# Patient Record
Sex: FEMALE | Race: WHITE | NOT HISPANIC OR LATINO | URBAN - METROPOLITAN AREA
[De-identification: names, ages, dates, MRNs, and addresses within clinical notes are randomized per-mention and may not be internally consistent; named-entity substitution may affect disease eponyms.]

---

## 2024-08-15 ENCOUNTER — HOSPITAL ENCOUNTER (OUTPATIENT)
Facility: HOSPITAL | Age: 29
Setting detail: OBSERVATION
Discharge: HOME | End: 2024-08-17
Attending: INTERNAL MEDICINE | Admitting: INTERNAL MEDICINE
Payer: COMMERCIAL

## 2024-08-15 ENCOUNTER — APPOINTMENT (OUTPATIENT)
Dept: RADIOLOGY | Facility: HOSPITAL | Age: 29
End: 2024-08-15
Payer: COMMERCIAL

## 2024-08-15 DIAGNOSIS — K35.30 ACUTE APPENDICITIS WITH LOCALIZED PERITONITIS, WITHOUT PERFORATION, ABSCESS, OR GANGRENE: ICD-10-CM

## 2024-08-15 DIAGNOSIS — N39.0 URINARY TRACT INFECTION WITHOUT HEMATURIA, SITE UNSPECIFIED: ICD-10-CM

## 2024-08-15 DIAGNOSIS — K35.30 ACUTE APPENDICITIS WITH LOCALIZED PERITONITIS, UNSPECIFIED WHETHER ABSCESS PRESENT, UNSPECIFIED WHETHER GANGRENE PRESENT, UNSPECIFIED WHETHER PERFORATION PRESENT: ICD-10-CM

## 2024-08-15 DIAGNOSIS — K52.9 COLITIS: Primary | ICD-10-CM

## 2024-08-15 DIAGNOSIS — Z90.49 S/P LAPAROSCOPIC APPENDECTOMY: ICD-10-CM

## 2024-08-15 LAB
ALBUMIN SERPL BCP-MCNC: 4.4 G/DL (ref 3.4–5)
ALP SERPL-CCNC: 79 U/L (ref 33–110)
ALT SERPL W P-5'-P-CCNC: 16 U/L (ref 7–45)
ANION GAP SERPL CALC-SCNC: 12 MMOL/L (ref 10–20)
APPEARANCE UR: ABNORMAL
AST SERPL W P-5'-P-CCNC: 18 U/L (ref 9–39)
B-HCG SERPL-ACNC: <2 MIU/ML
BACTERIA #/AREA URNS AUTO: ABNORMAL /HPF
BASOPHILS # BLD AUTO: 0.07 X10*3/UL (ref 0–0.1)
BASOPHILS NFR BLD AUTO: 0.5 %
BILIRUB SERPL-MCNC: 0.6 MG/DL (ref 0–1.2)
BILIRUB UR STRIP.AUTO-MCNC: NEGATIVE MG/DL
BUN SERPL-MCNC: 11 MG/DL (ref 6–23)
CALCIUM SERPL-MCNC: 9 MG/DL (ref 8.6–10.3)
CHLORIDE SERPL-SCNC: 107 MMOL/L (ref 98–107)
CO2 SERPL-SCNC: 25 MMOL/L (ref 21–32)
COLOR UR: YELLOW
CREAT SERPL-MCNC: 0.86 MG/DL (ref 0.5–1.05)
CRP SERPL-MCNC: 1.27 MG/DL
EGFRCR SERPLBLD CKD-EPI 2021: >90 ML/MIN/1.73M*2
EOSINOPHIL # BLD AUTO: 0.14 X10*3/UL (ref 0–0.7)
EOSINOPHIL NFR BLD AUTO: 1 %
ERYTHROCYTE [DISTWIDTH] IN BLOOD BY AUTOMATED COUNT: 14.5 % (ref 11.5–14.5)
ERYTHROCYTE [SEDIMENTATION RATE] IN BLOOD BY WESTERGREN METHOD: 26 MM/H (ref 0–20)
GLUCOSE SERPL-MCNC: 89 MG/DL (ref 74–99)
GLUCOSE UR STRIP.AUTO-MCNC: NORMAL MG/DL
HCT VFR BLD AUTO: 40.4 % (ref 36–46)
HGB BLD-MCNC: 12.3 G/DL (ref 12–16)
IMM GRANULOCYTES # BLD AUTO: 0.04 X10*3/UL (ref 0–0.7)
IMM GRANULOCYTES NFR BLD AUTO: 0.3 % (ref 0–0.9)
INR PPP: 1.2 (ref 0.9–1.1)
KETONES UR STRIP.AUTO-MCNC: NEGATIVE MG/DL
LACTATE SERPL-SCNC: 0.7 MMOL/L (ref 0.4–2)
LEUKOCYTE ESTERASE UR QL STRIP.AUTO: ABNORMAL
LIPASE SERPL-CCNC: 16 U/L (ref 9–82)
LYMPHOCYTES # BLD AUTO: 2.11 X10*3/UL (ref 1.2–4.8)
LYMPHOCYTES NFR BLD AUTO: 15.5 %
MCH RBC QN AUTO: 25.1 PG (ref 26–34)
MCHC RBC AUTO-ENTMCNC: 30.4 G/DL (ref 32–36)
MCV RBC AUTO: 82 FL (ref 80–100)
MONOCYTES # BLD AUTO: 0.8 X10*3/UL (ref 0.1–1)
MONOCYTES NFR BLD AUTO: 5.9 %
MUCOUS THREADS #/AREA URNS AUTO: ABNORMAL /LPF
NEUTROPHILS # BLD AUTO: 10.41 X10*3/UL (ref 1.2–7.7)
NEUTROPHILS NFR BLD AUTO: 76.8 %
NITRITE UR QL STRIP.AUTO: NEGATIVE
NRBC BLD-RTO: 0 /100 WBCS (ref 0–0)
PH UR STRIP.AUTO: 5 [PH]
PLATELET # BLD AUTO: 436 X10*3/UL (ref 150–450)
POTASSIUM SERPL-SCNC: 3.6 MMOL/L (ref 3.5–5.3)
PROT SERPL-MCNC: 7.4 G/DL (ref 6.4–8.2)
PROT UR STRIP.AUTO-MCNC: ABNORMAL MG/DL
PROTHROMBIN TIME: 13 SECONDS (ref 9.8–12.8)
RBC # BLD AUTO: 4.9 X10*6/UL (ref 4–5.2)
RBC # UR STRIP.AUTO: ABNORMAL /UL
RBC #/AREA URNS AUTO: ABNORMAL /HPF
SODIUM SERPL-SCNC: 140 MMOL/L (ref 136–145)
SP GR UR STRIP.AUTO: 1.03
SQUAMOUS #/AREA URNS AUTO: ABNORMAL /HPF
UROBILINOGEN UR STRIP.AUTO-MCNC: NORMAL MG/DL
WBC # BLD AUTO: 13.6 X10*3/UL (ref 4.4–11.3)
WBC #/AREA URNS AUTO: ABNORMAL /HPF

## 2024-08-15 PROCEDURE — 87086 URINE CULTURE/COLONY COUNT: CPT | Mod: AHULAB | Performed by: NURSE PRACTITIONER

## 2024-08-15 PROCEDURE — 85610 PROTHROMBIN TIME: CPT | Performed by: NURSE PRACTITIONER

## 2024-08-15 PROCEDURE — 99285 EMERGENCY DEPT VISIT HI MDM: CPT | Mod: 25

## 2024-08-15 PROCEDURE — 74177 CT ABD & PELVIS W/CONTRAST: CPT

## 2024-08-15 PROCEDURE — 74177 CT ABD & PELVIS W/CONTRAST: CPT | Performed by: RADIOLOGY

## 2024-08-15 PROCEDURE — 81001 URINALYSIS AUTO W/SCOPE: CPT | Performed by: NURSE PRACTITIONER

## 2024-08-15 PROCEDURE — 80053 COMPREHEN METABOLIC PANEL: CPT | Performed by: NURSE PRACTITIONER

## 2024-08-15 PROCEDURE — 83690 ASSAY OF LIPASE: CPT

## 2024-08-15 PROCEDURE — 85652 RBC SED RATE AUTOMATED: CPT | Performed by: INTERNAL MEDICINE

## 2024-08-15 PROCEDURE — 85025 COMPLETE CBC W/AUTO DIFF WBC: CPT | Performed by: NURSE PRACTITIONER

## 2024-08-15 PROCEDURE — 86140 C-REACTIVE PROTEIN: CPT | Performed by: INTERNAL MEDICINE

## 2024-08-15 PROCEDURE — 84702 CHORIONIC GONADOTROPIN TEST: CPT

## 2024-08-15 PROCEDURE — 2550000001 HC RX 255 CONTRASTS: Performed by: NURSE PRACTITIONER

## 2024-08-15 PROCEDURE — 83605 ASSAY OF LACTIC ACID: CPT | Performed by: NURSE PRACTITIONER

## 2024-08-15 PROCEDURE — 36415 COLL VENOUS BLD VENIPUNCTURE: CPT | Performed by: NURSE PRACTITIONER

## 2024-08-15 RX ORDER — ONDANSETRON HYDROCHLORIDE 2 MG/ML
4 INJECTION, SOLUTION INTRAVENOUS ONCE
Status: COMPLETED | OUTPATIENT
Start: 2024-08-15 | End: 2024-08-16

## 2024-08-15 RX ORDER — MORPHINE SULFATE 4 MG/ML
4 INJECTION, SOLUTION INTRAMUSCULAR; INTRAVENOUS ONCE
Status: COMPLETED | OUTPATIENT
Start: 2024-08-15 | End: 2024-08-16

## 2024-08-15 RX ORDER — METRONIDAZOLE 500 MG/100ML
500 INJECTION, SOLUTION INTRAVENOUS ONCE
Status: COMPLETED | OUTPATIENT
Start: 2024-08-15 | End: 2024-08-16

## 2024-08-15 RX ORDER — CIPROFLOXACIN 2 MG/ML
400 INJECTION, SOLUTION INTRAVENOUS ONCE
Status: DISCONTINUED | OUTPATIENT
Start: 2024-08-15 | End: 2024-08-16 | Stop reason: SDUPTHER

## 2024-08-15 RX ORDER — MORPHINE SULFATE 4 MG/ML
4 INJECTION, SOLUTION INTRAMUSCULAR; INTRAVENOUS ONCE
Status: DISCONTINUED | OUTPATIENT
Start: 2024-08-15 | End: 2024-08-15 | Stop reason: SDUPTHER

## 2024-08-15 ASSESSMENT — PAIN SCALES - GENERAL: PAINLEVEL_OUTOF10: 9

## 2024-08-15 ASSESSMENT — COLUMBIA-SUICIDE SEVERITY RATING SCALE - C-SSRS
1. IN THE PAST MONTH, HAVE YOU WISHED YOU WERE DEAD OR WISHED YOU COULD GO TO SLEEP AND NOT WAKE UP?: NO
6. HAVE YOU EVER DONE ANYTHING, STARTED TO DO ANYTHING, OR PREPARED TO DO ANYTHING TO END YOUR LIFE?: NO
2. HAVE YOU ACTUALLY HAD ANY THOUGHTS OF KILLING YOURSELF?: NO

## 2024-08-15 ASSESSMENT — PAIN - FUNCTIONAL ASSESSMENT: PAIN_FUNCTIONAL_ASSESSMENT: 0-10

## 2024-08-15 ASSESSMENT — PAIN DESCRIPTION - ORIENTATION: ORIENTATION: RIGHT;MID

## 2024-08-15 ASSESSMENT — PAIN DESCRIPTION - LOCATION: LOCATION: ABDOMEN

## 2024-08-15 NOTE — ED TRIAGE NOTES
TRIAGE NOTE   I saw the patient as the Clinician in Triage and performed a brief history and physical exam, established acuity, and ordered appropriate tests to develop basic plan of care. Patient will be seen by an MARK, resident and/or physician who will independently evaluate the patient. Please see subsequent provider notes for further details and disposition.     Brief HPI: In brief, Akash Wu is a 28 y.o. female that presents for Acute right lower quadrant pain with nausea and vomiting that started yesterday.  She rates her pain 9 out of 10.  Movement or walking worsens the pain.  The pain radiates from the right lower quadrant throughout her abdomen.  She has had chills and hot flashes.  She is in a monogamous relationship and she has a copper IUD in place.  She is allergic to cefdinir and penicillin with anaphylactic response.  She is on Prozac at home medication.  She denies any recent trauma or fall.  She was hypertensive in triage with a blood pressure 141/108 but this could be pain related.  She was afebrile in triage.  She was borderline tachycardia with a heart rate of 100 bpm and Respess at 16.  She denies melena or hematochezia.  She denies hematuria.  She denies dysuria.  She denies headache or confusion.  She denies chest pain or dyspnea..     Focused Physical exam:   Positive obturator, positive psoas, positive McBurney point with rebound tenderness in the right lower quadrant.  Normal bowel sounds.  Clear bilateral lung sounds.  Normal S1-S2 and slightly tachycardic.  She is alert and oriented and there is no confusion noted.  Skin appears to be normal in temperature and color.  No skin rash appreciated.     Plan/MDM:   differential could be acute appendicitis, colitis, diverticulitis, kidney stones, urinary tract infection, viral illness.  CT abdomen pelvis was ordered and she received morphine, Zofran, normal saline and I requested she remain NPO.  Basic labs including CBC CMP  lactate and urinalysis.  She has a copper IUD in place and denies pregnancy.  She is in a monogamous relationship with her .     Please see subsequent provider note for further details and disposition

## 2024-08-15 NOTE — ED TRIAGE NOTES
Pt presents to the ED from home with c/o abdominal pain. Pt states the pain started yesterday and it has gotten worse as the day has gone on. Pt states she has had chills, hot flashes, pt also appears pale. Pt states the pain is radiating throughout abdomen. Pt states she is nauseous and has had diarrhea. Denies CP, but endorses SOB.

## 2024-08-16 ENCOUNTER — ANESTHESIA EVENT (OUTPATIENT)
Dept: OPERATING ROOM | Facility: HOSPITAL | Age: 29
End: 2024-08-16
Payer: COMMERCIAL

## 2024-08-16 ENCOUNTER — ANESTHESIA (OUTPATIENT)
Dept: OPERATING ROOM | Facility: HOSPITAL | Age: 29
End: 2024-08-16
Payer: COMMERCIAL

## 2024-08-16 PROBLEM — K35.30 ACUTE APPENDICITIS WITH LOCALIZED PERITONITIS: Status: ACTIVE | Noted: 2024-08-15

## 2024-08-16 PROBLEM — D64.9 ANEMIA: Status: ACTIVE | Noted: 2024-08-16

## 2024-08-16 PROBLEM — E66.9 OBESITY: Status: ACTIVE | Noted: 2024-08-16

## 2024-08-16 PROBLEM — F32.A DEPRESSION: Status: ACTIVE | Noted: 2024-08-16

## 2024-08-16 PROBLEM — F41.9 ANXIETY: Status: ACTIVE | Noted: 2024-08-16

## 2024-08-16 PROBLEM — K52.9 COLITIS: Status: ACTIVE | Noted: 2024-08-16

## 2024-08-16 PROBLEM — F31.9 BIPOLAR DISORDER (MULTI): Status: ACTIVE | Noted: 2024-08-16

## 2024-08-16 LAB
ALBUMIN SERPL BCP-MCNC: 3.4 G/DL (ref 3.4–5)
ALP SERPL-CCNC: 60 U/L (ref 33–110)
ALT SERPL W P-5'-P-CCNC: 12 U/L (ref 7–45)
ANION GAP SERPL CALC-SCNC: 10 MMOL/L (ref 10–20)
AST SERPL W P-5'-P-CCNC: 13 U/L (ref 9–39)
BASOPHILS # BLD AUTO: 0.06 X10*3/UL (ref 0–0.1)
BASOPHILS NFR BLD AUTO: 0.5 %
BILIRUB SERPL-MCNC: 0.5 MG/DL (ref 0–1.2)
BUN SERPL-MCNC: 9 MG/DL (ref 6–23)
CALCIUM SERPL-MCNC: 7.6 MG/DL (ref 8.6–10.3)
CHLORIDE SERPL-SCNC: 111 MMOL/L (ref 98–107)
CO2 SERPL-SCNC: 22 MMOL/L (ref 21–32)
CREAT SERPL-MCNC: 0.75 MG/DL (ref 0.5–1.05)
EGFRCR SERPLBLD CKD-EPI 2021: >90 ML/MIN/1.73M*2
EOSINOPHIL # BLD AUTO: 0.17 X10*3/UL (ref 0–0.7)
EOSINOPHIL NFR BLD AUTO: 1.4 %
ERYTHROCYTE [DISTWIDTH] IN BLOOD BY AUTOMATED COUNT: 14.4 % (ref 11.5–14.5)
GLUCOSE SERPL-MCNC: 80 MG/DL (ref 74–99)
HCT VFR BLD AUTO: 35.1 % (ref 36–46)
HGB BLD-MCNC: 10.7 G/DL (ref 12–16)
IMM GRANULOCYTES # BLD AUTO: 0.05 X10*3/UL (ref 0–0.7)
IMM GRANULOCYTES NFR BLD AUTO: 0.4 % (ref 0–0.9)
LYMPHOCYTES # BLD AUTO: 2.49 X10*3/UL (ref 1.2–4.8)
LYMPHOCYTES NFR BLD AUTO: 20.9 %
MAGNESIUM SERPL-MCNC: 1.7 MG/DL (ref 1.6–2.4)
MCH RBC QN AUTO: 25.2 PG (ref 26–34)
MCHC RBC AUTO-ENTMCNC: 30.5 G/DL (ref 32–36)
MCV RBC AUTO: 83 FL (ref 80–100)
MONOCYTES # BLD AUTO: 0.94 X10*3/UL (ref 0.1–1)
MONOCYTES NFR BLD AUTO: 7.9 %
NEUTROPHILS # BLD AUTO: 8.19 X10*3/UL (ref 1.2–7.7)
NEUTROPHILS NFR BLD AUTO: 68.9 %
NRBC BLD-RTO: 0 /100 WBCS (ref 0–0)
PLATELET # BLD AUTO: 325 X10*3/UL (ref 150–450)
POTASSIUM SERPL-SCNC: 3.6 MMOL/L (ref 3.5–5.3)
PROT SERPL-MCNC: 5.8 G/DL (ref 6.4–8.2)
RBC # BLD AUTO: 4.25 X10*6/UL (ref 4–5.2)
SODIUM SERPL-SCNC: 139 MMOL/L (ref 136–145)
WBC # BLD AUTO: 11.9 X10*3/UL (ref 4.4–11.3)

## 2024-08-16 PROCEDURE — 7100000002 HC RECOVERY ROOM TIME - EACH INCREMENTAL 1 MINUTE: Performed by: SURGERY

## 2024-08-16 PROCEDURE — 2500000004 HC RX 250 GENERAL PHARMACY W/ HCPCS (ALT 636 FOR OP/ED): Performed by: INTERNAL MEDICINE

## 2024-08-16 PROCEDURE — 96372 THER/PROPH/DIAG INJ SC/IM: CPT | Performed by: HOSPITALIST

## 2024-08-16 PROCEDURE — 96367 TX/PROPH/DG ADDL SEQ IV INF: CPT | Mod: 59

## 2024-08-16 PROCEDURE — 3600000009 HC OR TIME - EACH INCREMENTAL 1 MINUTE - PROCEDURE LEVEL FOUR: Performed by: SURGERY

## 2024-08-16 PROCEDURE — 2500000004 HC RX 250 GENERAL PHARMACY W/ HCPCS (ALT 636 FOR OP/ED): Performed by: SURGERY

## 2024-08-16 PROCEDURE — G0378 HOSPITAL OBSERVATION PER HR: HCPCS

## 2024-08-16 PROCEDURE — 3600000004 HC OR TIME - INITIAL BASE CHARGE - PROCEDURE LEVEL FOUR: Performed by: SURGERY

## 2024-08-16 PROCEDURE — 2500000005 HC RX 250 GENERAL PHARMACY W/O HCPCS: Performed by: ANESTHESIOLOGY

## 2024-08-16 PROCEDURE — 2500000004 HC RX 250 GENERAL PHARMACY W/ HCPCS (ALT 636 FOR OP/ED)

## 2024-08-16 PROCEDURE — 3700000001 HC GENERAL ANESTHESIA TIME - INITIAL BASE CHARGE: Performed by: SURGERY

## 2024-08-16 PROCEDURE — 2500000004 HC RX 250 GENERAL PHARMACY W/ HCPCS (ALT 636 FOR OP/ED): Performed by: ANESTHESIOLOGY

## 2024-08-16 PROCEDURE — 96366 THER/PROPH/DIAG IV INF ADDON: CPT | Mod: 59

## 2024-08-16 PROCEDURE — 80053 COMPREHEN METABOLIC PANEL: CPT | Performed by: HOSPITALIST

## 2024-08-16 PROCEDURE — 99221 1ST HOSP IP/OBS SF/LOW 40: CPT

## 2024-08-16 PROCEDURE — 99222 1ST HOSP IP/OBS MODERATE 55: CPT | Performed by: SURGERY

## 2024-08-16 PROCEDURE — 7100000001 HC RECOVERY ROOM TIME - INITIAL BASE CHARGE: Performed by: SURGERY

## 2024-08-16 PROCEDURE — 2500000005 HC RX 250 GENERAL PHARMACY W/O HCPCS: Performed by: NURSE ANESTHETIST, CERTIFIED REGISTERED

## 2024-08-16 PROCEDURE — 2500000005 HC RX 250 GENERAL PHARMACY W/O HCPCS: Performed by: SURGERY

## 2024-08-16 PROCEDURE — 96376 TX/PRO/DX INJ SAME DRUG ADON: CPT | Mod: 59

## 2024-08-16 PROCEDURE — 96365 THER/PROPH/DIAG IV INF INIT: CPT | Mod: 59

## 2024-08-16 PROCEDURE — 2720000007 HC OR 272 NO HCPCS: Performed by: SURGERY

## 2024-08-16 PROCEDURE — 3700000002 HC GENERAL ANESTHESIA TIME - EACH INCREMENTAL 1 MINUTE: Performed by: SURGERY

## 2024-08-16 PROCEDURE — 2500000004 HC RX 250 GENERAL PHARMACY W/ HCPCS (ALT 636 FOR OP/ED): Performed by: NURSE ANESTHETIST, CERTIFIED REGISTERED

## 2024-08-16 PROCEDURE — 36415 COLL VENOUS BLD VENIPUNCTURE: CPT | Performed by: HOSPITALIST

## 2024-08-16 PROCEDURE — 2500000004 HC RX 250 GENERAL PHARMACY W/ HCPCS (ALT 636 FOR OP/ED): Mod: JZ | Performed by: INTERNAL MEDICINE

## 2024-08-16 PROCEDURE — 83735 ASSAY OF MAGNESIUM: CPT | Performed by: HOSPITALIST

## 2024-08-16 PROCEDURE — 2500000004 HC RX 250 GENERAL PHARMACY W/ HCPCS (ALT 636 FOR OP/ED): Mod: JZ | Performed by: HOSPITALIST

## 2024-08-16 PROCEDURE — 2500000004 HC RX 250 GENERAL PHARMACY W/ HCPCS (ALT 636 FOR OP/ED): Performed by: NURSE PRACTITIONER

## 2024-08-16 PROCEDURE — 85025 COMPLETE CBC W/AUTO DIFF WBC: CPT | Performed by: HOSPITALIST

## 2024-08-16 PROCEDURE — 96375 TX/PRO/DX INJ NEW DRUG ADDON: CPT | Mod: 59

## 2024-08-16 PROCEDURE — 44970 LAPAROSCOPY APPENDECTOMY: CPT | Performed by: SURGERY

## 2024-08-16 RX ORDER — HYDROMORPHONE HYDROCHLORIDE 1 MG/ML
INJECTION, SOLUTION INTRAMUSCULAR; INTRAVENOUS; SUBCUTANEOUS AS NEEDED
Status: DISCONTINUED | OUTPATIENT
Start: 2024-08-16 | End: 2024-08-16

## 2024-08-16 RX ORDER — SODIUM CHLORIDE, SODIUM LACTATE, POTASSIUM CHLORIDE, CALCIUM CHLORIDE 600; 310; 30; 20 MG/100ML; MG/100ML; MG/100ML; MG/100ML
125 INJECTION, SOLUTION INTRAVENOUS CONTINUOUS
Status: ACTIVE | OUTPATIENT
Start: 2024-08-16 | End: 2024-08-16

## 2024-08-16 RX ORDER — LIDOCAINE HYDROCHLORIDE 20 MG/ML
INJECTION, SOLUTION INFILTRATION; PERINEURAL AS NEEDED
Status: DISCONTINUED | OUTPATIENT
Start: 2024-08-16 | End: 2024-08-16

## 2024-08-16 RX ORDER — ONDANSETRON HYDROCHLORIDE 2 MG/ML
4 INJECTION, SOLUTION INTRAVENOUS EVERY 8 HOURS PRN
Status: DISCONTINUED | OUTPATIENT
Start: 2024-08-16 | End: 2024-08-17 | Stop reason: HOSPADM

## 2024-08-16 RX ORDER — METRONIDAZOLE 500 MG/100ML
500 INJECTION, SOLUTION INTRAVENOUS EVERY 8 HOURS
Status: DISCONTINUED | OUTPATIENT
Start: 2024-08-16 | End: 2024-08-17 | Stop reason: HOSPADM

## 2024-08-16 RX ORDER — OXYCODONE HYDROCHLORIDE 5 MG/1
5 TABLET ORAL EVERY 4 HOURS PRN
Status: DISCONTINUED | OUTPATIENT
Start: 2024-08-16 | End: 2024-08-16 | Stop reason: HOSPADM

## 2024-08-16 RX ORDER — KETOROLAC TROMETHAMINE 30 MG/ML
INJECTION, SOLUTION INTRAMUSCULAR; INTRAVENOUS AS NEEDED
Status: DISCONTINUED | OUTPATIENT
Start: 2024-08-16 | End: 2024-08-16

## 2024-08-16 RX ORDER — BUPROPION HYDROCHLORIDE 150 MG/1
150 TABLET ORAL
Status: DISCONTINUED | OUTPATIENT
Start: 2024-08-17 | End: 2024-08-17 | Stop reason: HOSPADM

## 2024-08-16 RX ORDER — BUPIVACAINE HYDROCHLORIDE 5 MG/ML
INJECTION, SOLUTION PERINEURAL AS NEEDED
Status: DISCONTINUED | OUTPATIENT
Start: 2024-08-16 | End: 2024-08-16 | Stop reason: HOSPADM

## 2024-08-16 RX ORDER — CIPROFLOXACIN 2 MG/ML
400 INJECTION, SOLUTION INTRAVENOUS EVERY 12 HOURS
Status: DISCONTINUED | OUTPATIENT
Start: 2024-08-16 | End: 2024-08-17 | Stop reason: HOSPADM

## 2024-08-16 RX ORDER — FENTANYL CITRATE 50 UG/ML
INJECTION, SOLUTION INTRAMUSCULAR; INTRAVENOUS AS NEEDED
Status: DISCONTINUED | OUTPATIENT
Start: 2024-08-16 | End: 2024-08-16

## 2024-08-16 RX ORDER — ROCURONIUM BROMIDE 10 MG/ML
INJECTION, SOLUTION INTRAVENOUS AS NEEDED
Status: DISCONTINUED | OUTPATIENT
Start: 2024-08-16 | End: 2024-08-16

## 2024-08-16 RX ORDER — MORPHINE SULFATE 2 MG/ML
1 INJECTION, SOLUTION INTRAMUSCULAR; INTRAVENOUS EVERY 4 HOURS PRN
Status: DISCONTINUED | OUTPATIENT
Start: 2024-08-16 | End: 2024-08-16

## 2024-08-16 RX ORDER — HEPARIN SODIUM 5000 [USP'U]/ML
7500 INJECTION, SOLUTION INTRAVENOUS; SUBCUTANEOUS EVERY 8 HOURS SCHEDULED
Status: DISCONTINUED | OUTPATIENT
Start: 2024-08-16 | End: 2024-08-17

## 2024-08-16 RX ORDER — BUPROPION HYDROCHLORIDE 150 MG/1
1 TABLET ORAL
COMMUNITY
Start: 2024-04-04

## 2024-08-16 RX ORDER — MIDAZOLAM HYDROCHLORIDE 1 MG/ML
INJECTION INTRAMUSCULAR; INTRAVENOUS AS NEEDED
Status: DISCONTINUED | OUTPATIENT
Start: 2024-08-16 | End: 2024-08-16

## 2024-08-16 RX ORDER — LIDOCAINE HYDROCHLORIDE 10 MG/ML
0.1 INJECTION, SOLUTION EPIDURAL; INFILTRATION; INTRACAUDAL; PERINEURAL ONCE
Status: DISCONTINUED | OUTPATIENT
Start: 2024-08-16 | End: 2024-08-16 | Stop reason: HOSPADM

## 2024-08-16 RX ORDER — OXYCODONE HYDROCHLORIDE 5 MG/1
5 TABLET ORAL EVERY 4 HOURS PRN
Status: DISCONTINUED | OUTPATIENT
Start: 2024-08-16 | End: 2024-08-17 | Stop reason: HOSPADM

## 2024-08-16 RX ORDER — SODIUM CHLORIDE, SODIUM LACTATE, POTASSIUM CHLORIDE, CALCIUM CHLORIDE 600; 310; 30; 20 MG/100ML; MG/100ML; MG/100ML; MG/100ML
100 INJECTION, SOLUTION INTRAVENOUS CONTINUOUS
Status: DISCONTINUED | OUTPATIENT
Start: 2024-08-16 | End: 2024-08-16 | Stop reason: HOSPADM

## 2024-08-16 RX ORDER — FLUOXETINE HYDROCHLORIDE 20 MG/1
20 CAPSULE ORAL
COMMUNITY
Start: 2024-04-04

## 2024-08-16 RX ORDER — PROPOFOL 10 MG/ML
INJECTION, EMULSION INTRAVENOUS AS NEEDED
Status: DISCONTINUED | OUTPATIENT
Start: 2024-08-16 | End: 2024-08-16

## 2024-08-16 RX ORDER — ACETAMINOPHEN 325 MG/1
650 TABLET ORAL EVERY 4 HOURS PRN
Status: DISCONTINUED | OUTPATIENT
Start: 2024-08-16 | End: 2024-08-17 | Stop reason: HOSPADM

## 2024-08-16 RX ORDER — FLUOXETINE HYDROCHLORIDE 20 MG/1
20 CAPSULE ORAL
Status: DISCONTINUED | OUTPATIENT
Start: 2024-08-17 | End: 2024-08-17 | Stop reason: HOSPADM

## 2024-08-16 RX ORDER — ONDANSETRON 4 MG/1
4 TABLET, ORALLY DISINTEGRATING ORAL EVERY 8 HOURS PRN
Status: DISCONTINUED | OUTPATIENT
Start: 2024-08-16 | End: 2024-08-17 | Stop reason: HOSPADM

## 2024-08-16 RX ORDER — MEPERIDINE HYDROCHLORIDE 25 MG/ML
12.5 INJECTION INTRAMUSCULAR; INTRAVENOUS; SUBCUTANEOUS EVERY 10 MIN PRN
Status: DISCONTINUED | OUTPATIENT
Start: 2024-08-16 | End: 2024-08-16 | Stop reason: HOSPADM

## 2024-08-16 RX ORDER — SODIUM CHLORIDE 0.9 G/100ML
IRRIGANT IRRIGATION AS NEEDED
Status: DISCONTINUED | OUTPATIENT
Start: 2024-08-16 | End: 2024-08-16 | Stop reason: HOSPADM

## 2024-08-16 RX ORDER — ONDANSETRON HYDROCHLORIDE 2 MG/ML
4 INJECTION, SOLUTION INTRAVENOUS ONCE AS NEEDED
Status: COMPLETED | OUTPATIENT
Start: 2024-08-16 | End: 2024-08-16

## 2024-08-16 SDOH — SOCIAL STABILITY: SOCIAL INSECURITY: HAS ANYONE EVER THREATENED TO HURT YOUR FAMILY OR YOUR PETS?: NO

## 2024-08-16 SDOH — SOCIAL STABILITY: SOCIAL INSECURITY: ARE THERE ANY APPARENT SIGNS OF INJURIES/BEHAVIORS THAT COULD BE RELATED TO ABUSE/NEGLECT?: NO

## 2024-08-16 SDOH — SOCIAL STABILITY: SOCIAL INSECURITY: ARE YOU OR HAVE YOU BEEN THREATENED OR ABUSED PHYSICALLY, EMOTIONALLY, OR SEXUALLY BY ANYONE?: NO

## 2024-08-16 SDOH — SOCIAL STABILITY: SOCIAL INSECURITY: WERE YOU ABLE TO COMPLETE ALL THE BEHAVIORAL HEALTH SCREENINGS?: YES

## 2024-08-16 SDOH — SOCIAL STABILITY: SOCIAL INSECURITY: DO YOU FEEL UNSAFE GOING BACK TO THE PLACE WHERE YOU ARE LIVING?: NO

## 2024-08-16 SDOH — SOCIAL STABILITY: SOCIAL INSECURITY: DOES ANYONE TRY TO KEEP YOU FROM HAVING/CONTACTING OTHER FRIENDS OR DOING THINGS OUTSIDE YOUR HOME?: NO

## 2024-08-16 SDOH — SOCIAL STABILITY: SOCIAL INSECURITY: ABUSE: ADULT

## 2024-08-16 SDOH — SOCIAL STABILITY: SOCIAL INSECURITY: HAVE YOU HAD THOUGHTS OF HARMING ANYONE ELSE?: NO

## 2024-08-16 SDOH — SOCIAL STABILITY: SOCIAL INSECURITY: DO YOU FEEL ANYONE HAS EXPLOITED OR TAKEN ADVANTAGE OF YOU FINANCIALLY OR OF YOUR PERSONAL PROPERTY?: NO

## 2024-08-16 ASSESSMENT — ACTIVITIES OF DAILY LIVING (ADL)
PATIENT'S MEMORY ADEQUATE TO SAFELY COMPLETE DAILY ACTIVITIES?: YES
LACK_OF_TRANSPORTATION: NO
BATHING: INDEPENDENT
WALKS IN HOME: INDEPENDENT
JUDGMENT_ADEQUATE_SAFELY_COMPLETE_DAILY_ACTIVITIES: YES
GROOMING: INDEPENDENT
DRESSING YOURSELF: INDEPENDENT
TOILETING: INDEPENDENT
HEARING - RIGHT EAR: FUNCTIONAL
HEARING - LEFT EAR: FUNCTIONAL
FEEDING YOURSELF: INDEPENDENT
ADEQUATE_TO_COMPLETE_ADL: YES

## 2024-08-16 ASSESSMENT — PATIENT HEALTH QUESTIONNAIRE - PHQ9
1. LITTLE INTEREST OR PLEASURE IN DOING THINGS: NOT AT ALL
2. FEELING DOWN, DEPRESSED OR HOPELESS: NOT AT ALL
SUM OF ALL RESPONSES TO PHQ9 QUESTIONS 1 & 2: 0

## 2024-08-16 ASSESSMENT — LIFESTYLE VARIABLES
HOW MANY STANDARD DRINKS CONTAINING ALCOHOL DO YOU HAVE ON A TYPICAL DAY: 1 OR 2
SKIP TO QUESTIONS 9-10: 1
AUDIT-C TOTAL SCORE: 1
HOW OFTEN DO YOU HAVE 6 OR MORE DRINKS ON ONE OCCASION: NEVER
HOW OFTEN DO YOU HAVE A DRINK CONTAINING ALCOHOL: MONTHLY OR LESS
AUDIT-C TOTAL SCORE: 1

## 2024-08-16 ASSESSMENT — COGNITIVE AND FUNCTIONAL STATUS - GENERAL
MOVING FROM LYING ON BACK TO SITTING ON SIDE OF FLAT BED WITH BEDRAILS: A LITTLE
CLIMB 3 TO 5 STEPS WITH RAILING: A LITTLE
STANDING UP FROM CHAIR USING ARMS: A LITTLE
DAILY ACTIVITIY SCORE: 24
DAILY ACTIVITIY SCORE: 24
PATIENT BASELINE BEDBOUND: NO
MOBILITY SCORE: 24
TURNING FROM BACK TO SIDE WHILE IN FLAT BAD: A LITTLE
MOBILITY SCORE: 18
WALKING IN HOSPITAL ROOM: A LITTLE
MOVING TO AND FROM BED TO CHAIR: A LITTLE

## 2024-08-16 ASSESSMENT — PAIN - FUNCTIONAL ASSESSMENT
PAIN_FUNCTIONAL_ASSESSMENT: 0-10

## 2024-08-16 ASSESSMENT — ENCOUNTER SYMPTOMS
EYES NEGATIVE: 1
VOMITING: 0
PSYCHIATRIC NEGATIVE: 1
NAUSEA: 1
MUSCULOSKELETAL NEGATIVE: 1
DYSURIA: 0
CONSTIPATION: 0
DIARRHEA: 1
NEUROLOGICAL NEGATIVE: 1
BLOOD IN STOOL: 0
RECTAL PAIN: 0
ALLERGIC/IMMUNOLOGIC NEGATIVE: 1
HEMATOLOGIC/LYMPHATIC NEGATIVE: 1
APPETITE CHANGE: 1
RESPIRATORY NEGATIVE: 1
CARDIOVASCULAR NEGATIVE: 1
ENDOCRINE NEGATIVE: 1
ABDOMINAL PAIN: 1

## 2024-08-16 ASSESSMENT — PAIN SCALES - GENERAL
PAINLEVEL_OUTOF10: 5 - MODERATE PAIN
PAINLEVEL_OUTOF10: 5 - MODERATE PAIN
PAINLEVEL_OUTOF10: 8
PAINLEVEL_OUTOF10: 9
PAINLEVEL_OUTOF10: 7
PAIN_LEVEL: 0
PAINLEVEL_OUTOF10: 10 - WORST POSSIBLE PAIN
PAINLEVEL_OUTOF10: 6
PAINLEVEL_OUTOF10: 10 - WORST POSSIBLE PAIN
PAINLEVEL_OUTOF10: 5 - MODERATE PAIN
PAINLEVEL_OUTOF10: 8
PAINLEVEL_OUTOF10: 6

## 2024-08-16 NOTE — CONSULTS
Reason For Consult  Diffuse colitis     History Of Present Illness  Akash Wu is a 28 y.o. female with a past medical history of elevated BMI, bacterial vaginosis, depression/anxiety, pilonidal cyst presenting with right lower quadrant pain.  She reports that her pain started on Tuesday night. She felt stabbing pain in the epigastrium that later migrated to the RLQ.  The pain was associated with nausea but no vomiting and copious diarrhea (liquid stools with mucus).  Of note, she just came back from [place] last Sunday where she was hiking.  She denies fever/chills, hematemesis, hematochezia, melena, dysphagia, difficulty swallowing. Prior to these episode, she had formed bowel movements every day.  She denies smoking, marijuana, street drugs use.  She drinks alcohol socially (2 glasses of wine over the last 6 months).  Not on any PPI or H2 blockers.  Takes NSAIDs occasionally for cramping associated with periods.  Not on any anticoagulation.  She has history of nonhealing wound caused by surgical removal of pilonidal cyst.  Denies personal or family history of Crohn's, UC, colorectal cancer.  Denies pregnancy, dysuria, hematuria, or sick contacts.  Denies EGD/colonoscopy in the past.  ER labs significant for CRP 1.27, sed rate 26,  leukocytosis WBC 13.6-> 11.9 with left shift, H&H 12.3/14.4, MCV 82,UA consistent with UTI.  CT A/P showed diffuse colonic wall thickening with mild adjacent stranding suggestive of colitis.  There is no significant inflammatory stranding adjacent to the appendix although the appendix is mildly dilated in the size measuring up to 8 mm.  Mild or early appendicitis is not excluded in the appropriate clinical setting.  General surgery surgery was consulted for appendicitis.  GI was consulted for diffuse colitis.     Past Medical History  She has a past medical history of Personal history of other diseases of the digestive system (08/26/2014), Personal history of other  diseases of the digestive system (08/26/2014), Personal history of other diseases of the female genital tract (05/27/2015), Personal history of other infectious and parasitic diseases, Personal history of other mental and behavioral disorders (05/05/2014), and Slurred speech (06/02/2014).    Surgical History  She has a past surgical history that includes Pilonidal cyst drainage (12/27/2016).     Social History  She has no history on file for tobacco use, alcohol use, and drug use.    Family History  No family history on file.     Allergies  Amoxicillin, Cefdinir, and Penicillins    Review of Systems  Review of Systems   Constitutional:  Positive for appetite change.   HENT: Negative.     Eyes: Negative.    Respiratory: Negative.     Cardiovascular: Negative.    Gastrointestinal:  Positive for abdominal pain, diarrhea and nausea. Negative for blood in stool, constipation, rectal pain and vomiting.   Endocrine: Negative.    Genitourinary: Negative.    Musculoskeletal: Negative.    Skin: Negative.    Allergic/Immunologic: Negative.    Neurological: Negative.    Hematological: Negative.    Psychiatric/Behavioral: Negative.           Physical Exam  Physical Exam  Vitals reviewed.   Constitutional:       Appearance: Normal appearance. She is obese.   Cardiovascular:      Rate and Rhythm: Regular rhythm. Tachycardia present.      Pulses: Normal pulses.      Heart sounds: Normal heart sounds.   Pulmonary:      Effort: Pulmonary effort is normal.      Breath sounds: Normal breath sounds.   Abdominal:      General: Bowel sounds are normal. There is no distension.      Palpations: Abdomen is soft. There is no mass.      Tenderness: There is abdominal tenderness. There is no guarding or rebound.      Hernia: No hernia is present.   Musculoskeletal:         General: Normal range of motion.      Cervical back: Neck supple.      Comments: Tunneled noninfected wound from pilonidal cyst.   Skin:     General: Skin is warm and dry.  "  Neurological:      General: No focal deficit present.      Mental Status: She is alert and oriented to person, place, and time.   Psychiatric:         Mood and Affect: Mood normal.         Behavior: Behavior normal.     Pilonidal cyst     Last Recorded Vitals  Blood pressure 117/59, pulse 96, temperature 36 °C (96.8 °F), temperature source Temporal, resp. rate 16, height 1.6 m (5' 3\"), weight 104 kg (230 lb), SpO2 97%.    Relevant Results      Scheduled medications  ciprofloxacin, 400 mg, intravenous, q12h  [Held by provider] heparin (porcine), 7,500 Units, subcutaneous, q8h SHIMA  metroNIDAZOLE, 500 mg, intravenous, q8h      Continuous medications     PRN medications  PRN medications: acetaminophen, acetaminophen, morphine, ondansetron ODT **OR** ondansetron, oxyCODONE      Results for orders placed or performed during the hospital encounter of 08/15/24 (from the past 24 hour(s))   CBC and Auto Differential   Result Value Ref Range    WBC 13.6 (H) 4.4 - 11.3 x10*3/uL    nRBC 0.0 0.0 - 0.0 /100 WBCs    RBC 4.90 4.00 - 5.20 x10*6/uL    Hemoglobin 12.3 12.0 - 16.0 g/dL    Hematocrit 40.4 36.0 - 46.0 %    MCV 82 80 - 100 fL    MCH 25.1 (L) 26.0 - 34.0 pg    MCHC 30.4 (L) 32.0 - 36.0 g/dL    RDW 14.5 11.5 - 14.5 %    Platelets 436 150 - 450 x10*3/uL    Neutrophils % 76.8 40.0 - 80.0 %    Immature Granulocytes %, Automated 0.3 0.0 - 0.9 %    Lymphocytes % 15.5 13.0 - 44.0 %    Monocytes % 5.9 2.0 - 10.0 %    Eosinophils % 1.0 0.0 - 6.0 %    Basophils % 0.5 0.0 - 2.0 %    Neutrophils Absolute 10.41 (H) 1.20 - 7.70 x10*3/uL    Immature Granulocytes Absolute, Automated 0.04 0.00 - 0.70 x10*3/uL    Lymphocytes Absolute 2.11 1.20 - 4.80 x10*3/uL    Monocytes Absolute 0.80 0.10 - 1.00 x10*3/uL    Eosinophils Absolute 0.14 0.00 - 0.70 x10*3/uL    Basophils Absolute 0.07 0.00 - 0.10 x10*3/uL   Comprehensive metabolic panel   Result Value Ref Range    Glucose 89 74 - 99 mg/dL    Sodium 140 136 - 145 mmol/L    Potassium 3.6 3.5 - " 5.3 mmol/L    Chloride 107 98 - 107 mmol/L    Bicarbonate 25 21 - 32 mmol/L    Anion Gap 12 10 - 20 mmol/L    Urea Nitrogen 11 6 - 23 mg/dL    Creatinine 0.86 0.50 - 1.05 mg/dL    eGFR >90 >60 mL/min/1.73m*2    Calcium 9.0 8.6 - 10.3 mg/dL    Albumin 4.4 3.4 - 5.0 g/dL    Alkaline Phosphatase 79 33 - 110 U/L    Total Protein 7.4 6.4 - 8.2 g/dL    AST 18 9 - 39 U/L    Bilirubin, Total 0.6 0.0 - 1.2 mg/dL    ALT 16 7 - 45 U/L   Lactate   Result Value Ref Range    Lactate 0.7 0.4 - 2.0 mmol/L   Protime-INR   Result Value Ref Range    Protime 13.0 (H) 9.8 - 12.8 seconds    INR 1.2 (H) 0.9 - 1.1   Lipase   Result Value Ref Range    Lipase 16 9 - 82 U/L   Human Chorionic Gonadotropin, Serum Quantitative   Result Value Ref Range    HCG, Beta-Quantitative <2 <5 mIU/mL   Sedimentation rate, automated   Result Value Ref Range    Sedimentation Rate 26 (H) 0 - 20 mm/h   C-reactive protein   Result Value Ref Range    C-Reactive Protein 1.27 (H) <1.00 mg/dL   Urinalysis with Reflex Culture and Microscopic   Result Value Ref Range    Color, Urine Yellow Light-Yellow, Yellow, Dark-Yellow    Appearance, Urine Turbid (N) Clear    Specific Gravity, Urine 1.033 1.005 - 1.035    pH, Urine 5.0 5.0, 5.5, 6.0, 6.5, 7.0, 7.5, 8.0    Protein, Urine 10 (TRACE) NEGATIVE, 10 (TRACE), 20 (TRACE) mg/dL    Glucose, Urine Normal Normal mg/dL    Blood, Urine 0.06 (1+) (A) NEGATIVE    Ketones, Urine NEGATIVE NEGATIVE mg/dL    Bilirubin, Urine NEGATIVE NEGATIVE    Urobilinogen, Urine Normal Normal mg/dL    Nitrite, Urine NEGATIVE NEGATIVE    Leukocyte Esterase, Urine 500 Stephanie/µL (A) NEGATIVE   Microscopic Only, Urine   Result Value Ref Range    WBC, Urine 21-50 (A) 1-5, NONE /HPF    RBC, Urine 3-5 NONE, 1-2, 3-5 /HPF    Squamous Epithelial Cells, Urine 10-25 (FEW) Reference range not established. /HPF    Bacteria, Urine 2+ (A) NONE SEEN /HPF    Mucus, Urine 1+ Reference range not established. /LPF   Comprehensive metabolic panel   Result Value Ref  Range    Glucose 80 74 - 99 mg/dL    Sodium 139 136 - 145 mmol/L    Potassium 3.6 3.5 - 5.3 mmol/L    Chloride 111 (H) 98 - 107 mmol/L    Bicarbonate 22 21 - 32 mmol/L    Anion Gap 10 10 - 20 mmol/L    Urea Nitrogen 9 6 - 23 mg/dL    Creatinine 0.75 0.50 - 1.05 mg/dL    eGFR >90 >60 mL/min/1.73m*2    Calcium 7.6 (L) 8.6 - 10.3 mg/dL    Albumin 3.4 3.4 - 5.0 g/dL    Alkaline Phosphatase 60 33 - 110 U/L    Total Protein 5.8 (L) 6.4 - 8.2 g/dL    AST 13 9 - 39 U/L    Bilirubin, Total 0.5 0.0 - 1.2 mg/dL    ALT 12 7 - 45 U/L   CBC and Auto Differential   Result Value Ref Range    WBC 11.9 (H) 4.4 - 11.3 x10*3/uL    nRBC 0.0 0.0 - 0.0 /100 WBCs    RBC 4.25 4.00 - 5.20 x10*6/uL    Hemoglobin 10.7 (L) 12.0 - 16.0 g/dL    Hematocrit 35.1 (L) 36.0 - 46.0 %    MCV 83 80 - 100 fL    MCH 25.2 (L) 26.0 - 34.0 pg    MCHC 30.5 (L) 32.0 - 36.0 g/dL    RDW 14.4 11.5 - 14.5 %    Platelets 325 150 - 450 x10*3/uL    Neutrophils % 68.9 40.0 - 80.0 %    Immature Granulocytes %, Automated 0.4 0.0 - 0.9 %    Lymphocytes % 20.9 13.0 - 44.0 %    Monocytes % 7.9 2.0 - 10.0 %    Eosinophils % 1.4 0.0 - 6.0 %    Basophils % 0.5 0.0 - 2.0 %    Neutrophils Absolute 8.19 (H) 1.20 - 7.70 x10*3/uL    Immature Granulocytes Absolute, Automated 0.05 0.00 - 0.70 x10*3/uL    Lymphocytes Absolute 2.49 1.20 - 4.80 x10*3/uL    Monocytes Absolute 0.94 0.10 - 1.00 x10*3/uL    Eosinophils Absolute 0.17 0.00 - 0.70 x10*3/uL    Basophils Absolute 0.06 0.00 - 0.10 x10*3/uL   Magnesium   Result Value Ref Range    Magnesium 1.70 1.60 - 2.40 mg/dL    CT abdomen pelvis w IV contrast    Result Date: 8/15/2024  Interpreted By:  Finkelstein, Evan, STUDY: CT ABDOMEN PELVIS W IV CONTRAST;  8/15/2024 9:00 pm   INDICATION: Signs/Symptoms:Positive McBurney point, psoas, and obturator with nausea and vomiting.  Severe rebound tenderness to the right lower quadrant.  Has an IUD copper in place denies pregnancy.   COMPARISON: None   ACCESSION NUMBER(S): [id number]   ORDERING  CLINICIAN: ALVARADO JOHNSON   TECHNIQUE: Axial CT images of the abdomen and pelvis with coronal and sagittal reconstructed images obtained after intravenous administration of 75 mL Omnipaque 350   FINDINGS: LOWER CHEST: No acute abnormality of the lung bases.   ABDOMEN:   LIVER: Normal attenuation and contour. BILE DUCTS: Normal caliber. GALLBLADDER: No calcified gallstones. No wall thickening. PORTAL VEIN: Patent SPLEEN: Unremarkable. PANCREAS: Unremarkable. ADRENALS: Unremarkable. KIDNEYS, URETERS and URINARY BLADDER: Symmetric renal enhancement. No hydronephrosis or perinephric fluid collection. The bladder is decompressed, which limits evaluation. REPRODUCTIVE ORGANS: An IUD is present. No significant free pelvic fluid.   ABDOMINAL WALL: Within normal limits. PERITONEUM: No ascites, free air or fluid collection.   BOWEL: Diffuse colonic wall thickening with mild adjacent stranding. The appendix is dilated up to 8 mm, however, there is no adjacent inflammatory stranding.   VESSELS: No aortic aneurysm. RETROPERITONEUM: No pathologically enlarged retroperitoneal lymph nodes.   BONES: No acute osseous abnormality.       Diffuse colonic wall thickening with mild adjacent stranding suggestive of colitis. There is no significant inflammatory stranding adjacent to the appendix although the appendix is mildly dilated in size measuring up to 8 mm. Mild or early appendicitis is not excluded in the appropriate clinical setting.     MACRO: None.   Signed by: Evan Finkelstein 8/15/2024 9:46 PM Dictation workstation:   [id number]      Assessment/Plan     Akash Wu is a 28 y.o. female with a past medical history of elevated BMI, bacterial vaginosis, depression/anxiety, pilonidal cyst presenting with right lower quadrant pain.  GI was consulted for diffuse colitis.  Etiologies include acute appendicitis, infectious colitis, IBD, UTI.    -General Surgery following for acute appendicitis  -Antibiotics for UTI as per  primary team  -C. difficile, stool pathogen panel, ova/para pending  -Will order calprotectin to rule out IBD  -GI will follow      I spent 45 minutes in the professional and overall care of this patient.      Homar Salinas, ETELVINA-CNP

## 2024-08-16 NOTE — PROGRESS NOTES
Home with mother      08/16/24 0649   Current Planned Discharge Disposition   Current Planned Discharge Disposition Home

## 2024-08-16 NOTE — ANESTHESIA PREPROCEDURE EVALUATION
Patient: Akash Wu    Procedure Information       Date/Time: 08/16/24 1805    Procedure: Appendectomy Laparoscopy (Abdomen)    Location: [facility] OR 01 / Virtual [facility] OR    Surgeons: Saravanan Flowers MD            Relevant Problems   Anesthesia (within normal limits)      Cardiac (within normal limits)      Pulmonary (within normal limits)      Neuro   (+) Anxiety   (+) Bipolar disorder (Multi)   (+) Depression      GI  Acute appendicitis  Possible C diff colitis      /Renal (within normal limits)      Liver (within normal limits)      Endocrine   (+) Obesity      Hematology   (+) Anemia       Clinical information reviewed:    Allergies  Meds               NPO Detail:  NPO/Void Status  Date of Last Liquid: 08/15/24  Time of Last Liquid: 2100  Date of Last Solid: 08/14/24  Time of Last Solid: 2200  Last Intake Type: Clear fluids  Time of Last Void: 1753         Physical Exam    Airway  Mallampati: II     Cardiovascular    Dental    Pulmonary    Abdominal            Anesthesia Plan    History of general anesthesia?: yes  History of complications of general anesthesia?: no    ASA 3 - emergent     general     intravenous induction   Anesthetic plan and risks discussed with patient.

## 2024-08-16 NOTE — NURSING NOTE
1912: Patient arrival to PACU, report received/care assumed.    1920: Dilaudid 0.5 mg IV PRN given for pain per order    1927: Family updated via Epic messenger    1932: Zofran 0.4 mg IV given for nausea per order  1936: Dilaudid 0.5 mg IV PRN given for pain per order     2007: Handoff report given to room 629 RN    2014: Patient's family updated via telephone    2025: Patient's vitals stable, arousable/follows commands appropriately, pain and nausea controlled. Patient transported back to room 629 via bed, in stable condition.

## 2024-08-16 NOTE — CONSULTS
Reason For Consult  Appendicitis/colitis    History Of Present Illness  Akash Wu is a 28 y.o. female presenting with 2 days of abdominal pain.  Started in the epigastrium but is migrated mostly to the right lower quadrant.  Associated with nausea and decreased appetite.  In addition she has had copious watery diarrhea for the past 48 hours.  She is got back from [place] last Saturday but cannot recall any sick contacts.  In the ER CT scan was done showing mild pancolitis and a dilated fluid-filled appendix.  No prior abdominal surgeries.     Past Medical History  She has a past medical history of Personal history of other diseases of the digestive system (08/26/2014), Personal history of other diseases of the digestive system (08/26/2014), Personal history of other diseases of the female genital tract (05/27/2015), Personal history of other infectious and parasitic diseases, Personal history of other mental and behavioral disorders (05/05/2014), and Slurred speech (06/02/2014).    Surgical History  She has a past surgical history that includes Pilonidal cyst drainage (12/27/2016).     Social History  She has no history on file for tobacco use, alcohol use, and drug use.    Family History  No family history on file.     Allergies  Cefdinir and Penicillins    Review of Systems  Constitutional: no weight loss, no fevers, no malaise  HEENT: negative  Neck: negative  Pulmonary: no SOB, no cough  CV: no chest pain, otherwise negative  GI: See HPI  : no hematuria, retention.  MS: no aches/pains  Neurologic: negative  Skin: no rashes, lesions  HEME: no bleeding tendency, no bruising  Psych: no mood issues     Physical Exam  General: well appearing, no acute distress, well nourished  HEENT: normal  Neck: supple  Pulmonary: lungs clear to auscultation bilaterally  CV: RR, S1S2, no murmurs.  Pulses palpable and equal.  Good capillary refill  Abdomen: soft, localized tenderness right lower quadrant with some  "guarding.  Mild epigastric tenderness as well  : grossly normal external genitalia  MS: grossly normal  Neurologic: alert and oriented, strength/sensation intact  Skin: non jaundiced, no lesions  Psych: mood appropriate       Last Recorded Vitals  Blood pressure 102/75, pulse 90, temperature 36 °C (96.8 °F), temperature source Temporal, resp. rate 16, height 1.6 m (5' 3\"), weight 104 kg (230 lb), SpO2 96%.    Relevant Results  WBC  4.4 - 11.3 x10*3/uL 11.9 High  13.6 High    nRBC  0.0 - 0.0 /100 WBCs 0.0 0.0   RBC  4.00 - 5.20 x10*6/uL 4.25 4.90   Hemoglobin  12.0 - 16.0 g/dL 10.7 Low  12.3   Hematocrit  36.0 - 46.0 % 35.1 Low  40.4   MCV  80 - 100 fL 83 82   MCH  26.0 - 34.0 pg 25.2 Low  25.1 Low    MCHC  32.0 - 36.0 g/dL 30.5 Low  30.4 Low    RDW  11.5 - 14.5 % 14.4 14.5   Platelets  150 - 450 x10*3/uL 325 436   Neutrophils %  40.0 - 80.0 % 68.9 76.8   Immature Granulocytes %, Automated  0.0 - 0.9 % 0.4 0.3 CM   Comment: Immature Granulocyte Count (IG) includes promyelocytes, myelocytes and metamyelocytes but does not include bands. Percent differential counts (%) should be interpreted in the context of the absolute cell counts (cells/UL).   Lymphocytes %  13.0 - 44.0 % 20.9 15.5   Monocytes %  2.0 - 10.0 % 7.9 5.9   Eosinophils %  0.0 - 6.0 % 1.4 1.0   Basophils %  0.0 - 2.0 % 0.5 0.5   Neutrophils Absolute  1.20 - 7.70 x10*3/uL 8.19 High  10.41 High    CT abdomen pelvis w IV contrast  Status: Final result     PACS Images     Show images for CT abdomen pelvis w IV contrast  Signed by    Signed Time Phone Pager   Evan R Finkelstein, MD 8/15/2024 21:46 [phone]     Exam Information    Status Exam Begun Exam Ended   Final 8/15/2024 20:52 8/15/2024 21:00     Study Result    Narrative & Impression   Interpreted By:  Finkelstein, Evan,   STUDY:  CT ABDOMEN PELVIS W IV CONTRAST;  8/15/2024 9:00 pm      INDICATION:  Signs/Symptoms:Positive McBurney point, psoas, and obturator with  nausea and vomiting.  " Severe rebound tenderness to the right lower  quadrant.  Has an IUD copper in place denies pregnancy.      COMPARISON:  None      ACCESSION NUMBER(S):  [id number]      ORDERING CLINICIAN:  ALVARADO JOHNSON      TECHNIQUE:  Axial CT images of the abdomen and pelvis with coronal and sagittal  reconstructed images obtained after intravenous administration of 75  mL Omnipaque 350      FINDINGS:  LOWER CHEST: No acute abnormality of the lung bases.      ABDOMEN:      LIVER: Normal attenuation and contour.  BILE DUCTS: Normal caliber.  GALLBLADDER: No calcified gallstones. No wall thickening.  PORTAL VEIN: Patent  SPLEEN: Unremarkable.  PANCREAS: Unremarkable.  ADRENALS: Unremarkable.  KIDNEYS, URETERS and URINARY BLADDER: Symmetric renal enhancement. No  hydronephrosis or perinephric fluid collection. The bladder is  decompressed, which limits evaluation. REPRODUCTIVE ORGANS: An IUD is  present. No significant free pelvic fluid.      ABDOMINAL WALL: Within normal limits.  PERITONEUM: No ascites, free air or fluid collection.      BOWEL: Diffuse colonic wall thickening with mild adjacent stranding.  The appendix is dilated up to 8 mm, however, there is no adjacent  inflammatory stranding.      VESSELS: No aortic aneurysm.  RETROPERITONEUM: No pathologically enlarged retroperitoneal lymph  nodes.      BONES: No acute osseous abnormality.      IMPRESSION:  Diffuse colonic wall thickening with mild adjacent stranding  suggestive of colitis. There is no significant inflammatory stranding  adjacent to the appendix although the appendix is mildly dilated in  size measuring up to 8 mm. Mild or early appendicitis is not excluded  in the appropriate clinical setting.                  Assessment/Plan     Patient comes in with acute abdominal pain with diarrhea.  CT scan suggestive of pancolitis and possible early appendicitis.  She does have physical exam findings consistent with appendicitis.  I was going to get an opinion from  radiology regarding these findings.  Certainly laparoscopic appendectomy would be the best option if we feel the appendix is playing a role in her clinical presentation.  Will follow-up with patient later on this morning after conferring with radiologist    I spent 35 minutes in the professional and overall care of this patient.      Saravanan Flowers MD

## 2024-08-16 NOTE — ED PROVIDER NOTES
"HPI     CC: Abdominal Pain     HPI: Akash Wu is a 28 y.o. female with a history of elevated BMI, depression/anxiety, pilonidal cyst, presents with right lower quadrant pain.  She states that pain started 2 nights ago.  It is localized to the epigastrium and right lower quadrant.  It is stabbing, constant, nonradiating.  She has associated nausea but no vomiting.  She has had multiple episodes of diarrhea every few minutes.  She denies fevers or chills.  Pain is 8-9/10 severity.  She only took Imodium for the symptoms.  She denies dysuria, hematuria, or frequency.  She denies chance of pregnancy.  She has no history of prior abdominal surgeries.    ROS: 10-point review of systems was performed and is otherwise negative except as noted in HPI.    Limitations to history: N/A    Independent Historians: Mom at bedside    External Records Reviewed: Outpatient notes in EMR    Past Medical History: Noncontributory except per HPI     Past Surgical History: Noncontributory except per HPI     Family History: Reviewed and noncontributory     Social History:  Denies tobacco. Denies ETOH. Denies illicit drugs.    Social Determinants Affecting Care: N/A    Allergies   Allergen Reactions    Cefdinir Anaphylaxis, Rash and Swelling     Lip, facial swelling    Penicillins Anaphylaxis, Hives, Other and Unknown     Rash, hives and face and throat swelling       Home Meds: No current outpatient medications     Physical Exam     ED Triage Vitals [08/15/24 1910]   Temperature Heart Rate Respirations BP   36 °C (96.8 °F) 100 16 (!) 141/108      Pulse Ox Temp Source Heart Rate Source Patient Position   98 % Temporal -- --      BP Location FiO2 (%)     -- --         Heart Rate:  [100]   Temperature:  [36 °C (96.8 °F)]   Respirations:  [16]   BP: (141)/(108)   Height:  [160 cm (5' 3\")]   Weight:  [104 kg (230 lb)]   Pulse Ox:  [98 %]      Physical Exam  Vitals and nursing note reviewed.     CONSTITUTIONAL: Well appearing, well " nourished, in no acute distress.   HENMT: Head atraumatic. Airway patent. Nasal mucosa clear. Mouth with normal mucosa, clear oropharynx. Uvula midline. Neck supple.    EYES: Clear bilaterally, pupils equally round and reactive to light.   CARDIOVASCULAR: Normal rate, regular rhythm.  Heart sounds S1, S2.  No murmurs, rubs or gallops. Normal pulses. Capillary refill < 2 sec.   RESPIRATORY: No increased work of breathing. Breath sounds clear and equal bilaterally.  GASTROINTESTINAL: Abdomen soft, non-distended, TTP RLQ. No rebound, no guarding. Normal bowel sounds. No palpable masses.  GENITOURINARY:  No CVA tenderness.  MUSCULOSKELETAL: Spine appears normal, range of motion is not limited, no muscle or joint tenderness. No edema.   NEUROLOGICAL: Alert and oriented, no asymmetry, moving all extremities equally.  SKIN: Warm, dry and intact. No rash or notable lesions.  PSYCHIATRIC: Normal mood and affect.  HEME/LYMPH: No adenopathy or splenomegaly.    Diagnostic Results      ECG: ECGs read and interpreted by me. See ED Course, below, for interpretation.    Labs Reviewed   CBC WITH AUTO DIFFERENTIAL - Abnormal       Result Value    WBC 13.6 (*)     nRBC 0.0      RBC 4.90      Hemoglobin 12.3      Hematocrit 40.4      MCV 82      MCH 25.1 (*)     MCHC 30.4 (*)     RDW 14.5      Platelets 436      Neutrophils % 76.8      Immature Granulocytes %, Automated 0.3      Lymphocytes % 15.5      Monocytes % 5.9      Eosinophils % 1.0      Basophils % 0.5      Neutrophils Absolute 10.41 (*)     Immature Granulocytes Absolute, Automated 0.04      Lymphocytes Absolute 2.11      Monocytes Absolute 0.80      Eosinophils Absolute 0.14      Basophils Absolute 0.07     PROTIME-INR - Abnormal    Protime 13.0 (*)     INR 1.2 (*)    URINALYSIS WITH REFLEX CULTURE AND MICROSCOPIC - Abnormal    Color, Urine Yellow      Appearance, Urine Turbid (*)     Specific Gravity, Urine 1.033      pH, Urine 5.0      Protein, Urine 10 (TRACE)       Glucose, Urine Normal      Blood, Urine 0.06 (1+) (*)     Ketones, Urine NEGATIVE      Bilirubin, Urine NEGATIVE      Urobilinogen, Urine Normal      Nitrite, Urine NEGATIVE      Leukocyte Esterase, Urine 500 Stephanie/µL (*)    MICROSCOPIC ONLY, URINE - Abnormal    WBC, Urine 21-50 (*)     RBC, Urine 3-5      Squamous Epithelial Cells, Urine 10-25 (FEW)      Bacteria, Urine 2+ (*)     Mucus, Urine 1+     SEDIMENTATION RATE, AUTOMATED - Abnormal    Sedimentation Rate 26 (*)    C-REACTIVE PROTEIN - Abnormal    C-Reactive Protein 1.27 (*)    COMPREHENSIVE METABOLIC PANEL - Normal    Glucose 89      Sodium 140      Potassium 3.6      Chloride 107      Bicarbonate 25      Anion Gap 12      Urea Nitrogen 11      Creatinine 0.86      eGFR >90      Calcium 9.0      Albumin 4.4      Alkaline Phosphatase 79      Total Protein 7.4      AST 18      Bilirubin, Total 0.6      ALT 16     LACTATE - Normal    Lactate 0.7      Narrative:     Venipuncture immediately after or during the administration of Metamizole may lead to falsely low results. Testing should be performed immediately  prior to Metamizole dosing.   LIPASE - Normal    Lipase 16      Narrative:     Venipuncture immediately after or during the administration of Metamizole may lead to falsely low results. Testing should be performed immediately prior to Metamizole dosing.   HUMAN CHORIONIC GONADOTROPIN, SERUM QUANTITATIVE - Normal    HCG, Beta-Quantitative <2      Narrative:      Total HCG measurement is performed using the [employer] Access   Immunoassay which detects intact HCG and free beta HCG subunit.    This test is not indicated for use as a tumor marker.   HCG testing is performed using a different test methodology at [facility] than other [facility]. Direct result comparison   should only be made within the same method.       URINE CULTURE   URINALYSIS WITH REFLEX CULTURE AND MICROSCOPIC    Narrative:     The following orders were created  for panel order Urinalysis with Reflex Culture and Microscopic.  Procedure                               Abnormality         Status                     ---------                               -----------         ------                     Urinalysis with Reflex C...[[id number]]  Abnormal            Final result               Extra Urine Gray Tube[[id number]]                                                         Please view results for these tests on the individual orders.   EXTRA URINE GRAY TUBE         CT abdomen pelvis w IV contrast   Final Result   Diffuse colonic wall thickening with mild adjacent stranding   suggestive of colitis. There is no significant inflammatory stranding   adjacent to the appendix although the appendix is mildly dilated in   size measuring up to 8 mm. Mild or early appendicitis is not excluded   in the appropriate clinical setting.             MACRO:   None.        Signed by: Evan Finkelstein 8/15/2024 9:46 PM   Dictation workstation:   [id number]                    Ekaterina Coma Scale Score: 15                  Procedure  Procedures    ED Course & MDM   Assessment/Plan:   Akash Wu is a 28 y.o. female with a history of elevated BMI, depression/anxiety, pilonidal cyst, presents with epigastric and right lower quadrant pain with associated nausea and diarrhea.  She is hemodynamically stable and well-appearing.  She does have focal tenderness in the epigastrium and right lower quadrant.  Workup was initiated and largely completed at the time of my seeing her as she had a prolonged wait time in the waiting room.  Labs are notable for CBC with mild leukocytosis 13.6, normal H&H, normal platelets, normal CMP, UA with possible UTI with leukocyte esterase, 21-50 WBCs and 2+ bacteria, normal lipase, lactate, and the hCG. CTAP shows diffuse colonic wall thickening with mild adjacent stranding suggestive of colitis. There is no significant inflammatory stranding adjacent to the  appendix although the appendix is mildly dilated in size measuring up to 8 mm. Mild or early appendicitis is not excluded in the appropriate clinical setting.  I did speak with Dr. Booth acute care surgery who recommends ESR, CRP, and admission to medicine with GI consultation.  She was started on Cipro Flagyl (has multiple antibiotic allergies).  She was given a liter bolus, Zofran, and morphine for pain. See below for details of ED course and ultimate disposition.    Medications   ondansetron (Zofran) injection 4 mg (has no administration in time range)   sodium chloride 0.9 % bolus 1,000 mL (has no administration in time range)   ciprofloxacin (Cipro) 400 mg in dextrose 5%  mL (has no administration in time range)   metroNIDAZOLE (Flagyl) 500 mg in sodium chloride (iso)  mL (has no administration in time range)   morphine injection 4 mg (has no administration in time range)   iohexol (OMNIPaque) 350 mg iodine/mL solution 75 mL (75 mL intravenous Given 8/15/24 2053)        ED Course as of 08/16/24 0012   Thu Aug 15, 2024   2354 ESR elevated 26, CRP elevated 1.27 [CG]   Fri Aug 16, 2024   0012 Patient was admitted to the observation unit for further management. [CG]      ED Course User Index  [CG] Enedelia Cervantes MD         Diagnoses as of 08/16/24 0012   Colitis   Urinary tract infection without hematuria, site unspecified       Disposition:   Admitted to OBS, discussed differential and findings with patient as well as any family members at bedside.      ED Prescriptions    None         Enedelia Cervantes MD  EM/IM/Peds    This note was dictated by speech recognition. Minor errors in transcription may be present.     Enedelia Cervantes MD  08/16/24 0012

## 2024-08-16 NOTE — OP NOTE
Appendectomy Laparoscopy Operative Note     Date: 8/15/2024 - 2024  OR Location: [facility] A OR    Name: Akash Wu, : 1995, Age: 28 y.o., MRN: [id number], Sex: female    Diagnosis  Pre-op Diagnosis      * Acute appendicitis with localized peritonitis, unspecified whether abscess present, unspecified whether gangrene present, unspecified whether perforation present [K35.30] Post-op Diagnosis     * Acute appendicitis with localized peritonitis, unspecified whether abscess present, unspecified whether gangrene present, unspecified whether perforation present [K35.30]     Procedures  Appendectomy Laparoscopy  [address] - [place] LAPAROSCOPIC APPENDECTOMY      Surgeons      * Saravanan Flowers - Primary    Resident/Fellow/Other Assistant:  Surgeons and Role:  * No surgeons found with a matching role *    Procedure Summary  Anesthesia: General  ASA: III  Anesthesia Staff: Anesthesiologist: Yemi Dickinson MD  CRNA: ETELVINA Benavidez-CRNA  Estimated Blood Loss: 5mL  Intra-op Medications:   Administrations occurring from 1805 to 1935 on 24:   Medication Name Total Dose   BUPivacaine HCl (Marcaine) 0.5 % (5 mg/mL) injection 20 mL   sodium chloride 0.9 % irrigation solution 2,000 mL   metroNIDAZOLE (Flagyl) 500 mg in sodium chloride (iso)  mL 500 mg              Anesthesia Record               Intraprocedure I/O Totals          Intake    metroNIDAZOLE (Flagyl) 500 mg in sodium chloride (iso)  mL 100.00 mL    Total Intake 100 mL          Specimen:   ID Type Source Tests Collected by Time   1 : APPENDIX Tissue APPENDIX SURGICAL PATHOLOGY EXAM Saravanan Flowers MD 2024 185        Staff:   Circulator: Lilia Goodwinub Person: Gray  Circulator: Azul  Scrub Person: Edmond         Drains and/or Catheters: * None in log *    Tourniquet Times:         Implants:     Findings: Early acute appendicitis without perforation    Indications: Akash Wu is an 28 y.o. female who is having surgery  for Acute appendicitis with localized peritonitis, unspecified whether abscess present, unspecified whether gangrene present, unspecified whether perforation present [K35.30].     The patient was seen in the preoperative area. The risks, benefits, complications, treatment options, non-operative alternatives, expected recovery and outcomes were discussed with the patient. The possibilities of reaction to medication, pulmonary aspiration, injury to surrounding structures, bleeding, recurrent infection, the need for additional procedures, failure to diagnose a condition, and creating a complication requiring transfusion or operation were discussed with the patient. The patient concurred with the proposed plan, giving informed consent.  The site of surgery was properly noted/marked if necessary per policy. The patient has been actively warmed in preoperative area. Preoperative antibiotics have been ordered and given within scheduled dosage time hours of incision. Venous thrombosis prophylaxis have been ordered including bilateral sequential compression devices    Procedure Details: DESCRIPTION:    Patient is diagnosed with appendicitis by exam and CT.  Risks and benefits were detailed. Informed consent for surgery was obtained.    The patient was brought to the OR and placed supine.  Time-out was performed. We confirmed the patient and procedure.  Antibiotics were administered. SCDs were placed on legs.  General anesthesia was given through an endotracheal tube.    We prepped and draped sterilely, injected Marcaine, made an umbilical incision with knife. Sharp incision was made in the fascia. We entered peritoneal cavity, placed Nena trocar.  We insufflated and then placed 5 mm port x2 in the left lower quadrant/suprapubic area respectively.  We tilted left side down and placed in slight Trendelenburg. Appendix was quickly identified.  It was suppurative, but non-perforated, non-gangrenous.     It was mobilized  anteromedially.  We divided the mesentery with the LigaSure device.  Appendix was then divided at the junction of the cecum with the Endo-BAILEE stapler.  Appendix was put in the EndoCatch bag, ultimately brought out through the umbilicus.  We irrigated copiously and aspirated the clear effluent.  The staple line noted to be hemostatic. No abscess cavities were seen.  Ports were removed under direct visualization.  Abdomen was desufflated.  I closed the umbilical fascia with 0-Vicryl.  Skin was closed with 4-0 Biosyn and Dermabond.  The patient went to the recovery room in satisfactory condition.    Complications:  None; patient tolerated the procedure well.    Disposition: PACU - hemodynamically stable.  Condition: stable         Additional Details:     Attending Attestation: I was present for the entire procedure.    Saravanan Flowers  Phone Number: [phone]

## 2024-08-16 NOTE — H&P (VIEW-ONLY)
Reason For Consult  Appendicitis/colitis    History Of Present Illness  Akash Wu is a 28 y.o. female presenting with 2 days of abdominal pain.  Started in the epigastrium but is migrated mostly to the right lower quadrant.  Associated with nausea and decreased appetite.  In addition she has had copious watery diarrhea for the past 48 hours.  She is got back from [place] last Saturday but cannot recall any sick contacts.  In the ER CT scan was done showing mild pancolitis and a dilated fluid-filled appendix.  No prior abdominal surgeries.     Past Medical History  She has a past medical history of Personal history of other diseases of the digestive system (08/26/2014), Personal history of other diseases of the digestive system (08/26/2014), Personal history of other diseases of the female genital tract (05/27/2015), Personal history of other infectious and parasitic diseases, Personal history of other mental and behavioral disorders (05/05/2014), and Slurred speech (06/02/2014).    Surgical History  She has a past surgical history that includes Pilonidal cyst drainage (12/27/2016).     Social History  She has no history on file for tobacco use, alcohol use, and drug use.    Family History  No family history on file.     Allergies  Cefdinir and Penicillins    Review of Systems  Constitutional: no weight loss, no fevers, no malaise  HEENT: negative  Neck: negative  Pulmonary: no SOB, no cough  CV: no chest pain, otherwise negative  GI: See HPI  : no hematuria, retention.  MS: no aches/pains  Neurologic: negative  Skin: no rashes, lesions  HEME: no bleeding tendency, no bruising  Psych: no mood issues     Physical Exam  General: well appearing, no acute distress, well nourished  HEENT: normal  Neck: supple  Pulmonary: lungs clear to auscultation bilaterally  CV: RR, S1S2, no murmurs.  Pulses palpable and equal.  Good capillary refill  Abdomen: soft, localized tenderness right lower quadrant with some  "guarding.  Mild epigastric tenderness as well  : grossly normal external genitalia  MS: grossly normal  Neurologic: alert and oriented, strength/sensation intact  Skin: non jaundiced, no lesions  Psych: mood appropriate       Last Recorded Vitals  Blood pressure 102/75, pulse 90, temperature 36 °C (96.8 °F), temperature source Temporal, resp. rate 16, height 1.6 m (5' 3\"), weight 104 kg (230 lb), SpO2 96%.    Relevant Results  WBC  4.4 - 11.3 x10*3/uL 11.9 High  13.6 High    nRBC  0.0 - 0.0 /100 WBCs 0.0 0.0   RBC  4.00 - 5.20 x10*6/uL 4.25 4.90   Hemoglobin  12.0 - 16.0 g/dL 10.7 Low  12.3   Hematocrit  36.0 - 46.0 % 35.1 Low  40.4   MCV  80 - 100 fL 83 82   MCH  26.0 - 34.0 pg 25.2 Low  25.1 Low    MCHC  32.0 - 36.0 g/dL 30.5 Low  30.4 Low    RDW  11.5 - 14.5 % 14.4 14.5   Platelets  150 - 450 x10*3/uL 325 436   Neutrophils %  40.0 - 80.0 % 68.9 76.8   Immature Granulocytes %, Automated  0.0 - 0.9 % 0.4 0.3 CM   Comment: Immature Granulocyte Count (IG) includes promyelocytes, myelocytes and metamyelocytes but does not include bands. Percent differential counts (%) should be interpreted in the context of the absolute cell counts (cells/UL).   Lymphocytes %  13.0 - 44.0 % 20.9 15.5   Monocytes %  2.0 - 10.0 % 7.9 5.9   Eosinophils %  0.0 - 6.0 % 1.4 1.0   Basophils %  0.0 - 2.0 % 0.5 0.5   Neutrophils Absolute  1.20 - 7.70 x10*3/uL 8.19 High  10.41 High    CT abdomen pelvis w IV contrast  Status: Final result     PACS Images     Show images for CT abdomen pelvis w IV contrast  Signed by    Signed Time Phone Pager   Evan R Finkelstein, MD 8/15/2024 21:46 [phone]     Exam Information    Status Exam Begun Exam Ended   Final 8/15/2024 20:52 8/15/2024 21:00     Study Result    Narrative & Impression   Interpreted By:  Finkelstein, Evan,   STUDY:  CT ABDOMEN PELVIS W IV CONTRAST;  8/15/2024 9:00 pm      INDICATION:  Signs/Symptoms:Positive McBurney point, psoas, and obturator with  nausea and vomiting.  " Severe rebound tenderness to the right lower  quadrant.  Has an IUD copper in place denies pregnancy.      COMPARISON:  None      ACCESSION NUMBER(S):  [id number]      ORDERING CLINICIAN:  ALVARADO JOHNSON      TECHNIQUE:  Axial CT images of the abdomen and pelvis with coronal and sagittal  reconstructed images obtained after intravenous administration of 75  mL Omnipaque 350      FINDINGS:  LOWER CHEST: No acute abnormality of the lung bases.      ABDOMEN:      LIVER: Normal attenuation and contour.  BILE DUCTS: Normal caliber.  GALLBLADDER: No calcified gallstones. No wall thickening.  PORTAL VEIN: Patent  SPLEEN: Unremarkable.  PANCREAS: Unremarkable.  ADRENALS: Unremarkable.  KIDNEYS, URETERS and URINARY BLADDER: Symmetric renal enhancement. No  hydronephrosis or perinephric fluid collection. The bladder is  decompressed, which limits evaluation. REPRODUCTIVE ORGANS: An IUD is  present. No significant free pelvic fluid.      ABDOMINAL WALL: Within normal limits.  PERITONEUM: No ascites, free air or fluid collection.      BOWEL: Diffuse colonic wall thickening with mild adjacent stranding.  The appendix is dilated up to 8 mm, however, there is no adjacent  inflammatory stranding.      VESSELS: No aortic aneurysm.  RETROPERITONEUM: No pathologically enlarged retroperitoneal lymph  nodes.      BONES: No acute osseous abnormality.      IMPRESSION:  Diffuse colonic wall thickening with mild adjacent stranding  suggestive of colitis. There is no significant inflammatory stranding  adjacent to the appendix although the appendix is mildly dilated in  size measuring up to 8 mm. Mild or early appendicitis is not excluded  in the appropriate clinical setting.                  Assessment/Plan     Patient comes in with acute abdominal pain with diarrhea.  CT scan suggestive of pancolitis and possible early appendicitis.  She does have physical exam findings consistent with appendicitis.  I was going to get an opinion from  radiology regarding these findings.  Certainly laparoscopic appendectomy would be the best option if we feel the appendix is playing a role in her clinical presentation.  Will follow-up with patient later on this morning after conferring with radiologist    I spent 35 minutes in the professional and overall care of this patient.      Saravanan Flowers MD

## 2024-08-16 NOTE — ANESTHESIA POSTPROCEDURE EVALUATION
Patient: Akash Wu    Procedure Summary       Date: 08/16/24 Room / Location: [facility] OR 01 / Virtual [facility] OR    Anesthesia Start: 1821 Anesthesia Stop: 1916    Procedure: Appendectomy Laparoscopy (Abdomen) Diagnosis:       Acute appendicitis with localized peritonitis, unspecified whether abscess present, unspecified whether gangrene present, unspecified whether perforation present      (Acute appendicitis with localized peritonitis, unspecified whether abscess present, unspecified whether gangrene present, unspecified whether perforation present [K35.30])    Surgeons: Saravanan Flowers MD Responsible Provider: Yemi Dickinson MD    Anesthesia Type: general ASA Status: 3 - Emergent            Anesthesia Type: general    Vitals Value Taken Time   /64 08/16/24 1930   Temp 36.4 °C (97.5 °F) 08/16/24 1912   Pulse 95 08/16/24 1930   Resp 17 08/16/24 1930   SpO2 97 % 08/16/24 1930       Anesthesia Post Evaluation    Patient location during evaluation: bedside  Patient participation: complete - patient cannot participate  Level of consciousness: awake  Pain score: 0  Pain management: adequate  Airway patency: patent  Cardiovascular status: acceptable  Respiratory status: acceptable  Hydration status: acceptable  Postoperative Nausea and Vomiting: none        No notable events documented.

## 2024-08-16 NOTE — H&P
History Of Present Illness  Akash Wu is a 28 y.o. female with a past medical history of depression/anxiety, acute gastris, and pilonidal cyst presenting with epigastric and right lower quadrant pain X2. Associated symptoms of nausea but no vomiting, diarrhea every couple minutes, and decrease of appetite. Denies chance of pregnancy, dysuria, hematuria. No history of abdominal surgeries or sick contacts.      Past Medical History  Past Medical History:   Diagnosis Date    Personal history of other diseases of the digestive system 08/26/2014    History of acute gastritis    Personal history of other diseases of the digestive system 08/26/2014    History of constipation    Personal history of other diseases of the female genital tract 05/27/2015    History of abnormal menstrual cycle    Personal history of other infectious and parasitic diseases     History of varicella    Personal history of other mental and behavioral disorders 05/05/2014    History of depression    Slurred speech 06/02/2014    Slurred speech       Surgical History  Past Surgical History:   Procedure Laterality Date    PILONIDAL CYST DRAINAGE  12/27/2016    Pilonidal Cyst Resection        Social History  She has no history on file for tobacco use, alcohol use, and drug use.    Family History  No family history on file.     Allergies  Cefdinir and Penicillins    Review of Systems   Constitutional:  Positive for appetite change.   Gastrointestinal:  Positive for abdominal pain, diarrhea and nausea.   Genitourinary:  Negative for dysuria.   Neurological: Negative.    Hematological: Negative.    Psychiatric/Behavioral: Negative.          Physical Exam  Cardiovascular:      Rate and Rhythm: Normal rate and regular rhythm.   Pulmonary:      Breath sounds: Normal breath sounds.   Abdominal:      Tenderness: There is abdominal tenderness. There is rebound.      Comments: Epigastric and RLQ tenderness    Neurological:      Mental Status: She is  "alert.   Psychiatric:         Attention and Perception: Attention normal.         Mood and Affect: Mood normal.         Behavior: Behavior is cooperative.          Last Recorded Vitals  Blood pressure 102/75, pulse 90, temperature 36 °C (96.8 °F), temperature source Temporal, resp. rate 16, height 1.6 m (5' 3\"), weight 104 kg (230 lb), SpO2 96%.    Relevant Results             Assessment/Plan   Assessment & Plan    Akash Wu is a 28 y.o. female with a past medical history of depression/anxiety, acute gastris, and pilonidal cyst presenting with epigastric and right lower quadrant pain X2. Associated with nausea, diarrhea, and decreased appetite. CBC with leokocytosis  11.9, H/H 10.7/35.1. UA with possible UTI with leukocyte esterase, 21-50 WBCs and 2+ bacteria. CTAP impression: diffuse colonic wall thickening with mild adjacent stranding suggestive of colitis. There is no significant inflammatory stranding adjacent to the appendix although the appendix is mildly dilated in size measuring up to 8 mm. Mild or early appendicitis is not excluded in the appropriate clinical setting. Metro and Cipro started due to pt's allergies. GI consulted. General surgery consulted and is conferring with radiology about possible appendectomy.    Colitis  Possible early appendicitis  -ESR mildly elevated 1.27  -antiemetics prn  -prn pain medication  -continue ciprofloxacin and flagyl  -GI consulted   -General surgery conferring with radiology for possible appendectomy  -IV fluids  -NPO  -Stool studies ordered/c-diff    UTI  -Continue ciprofloxacin  -culture pending    Heparin injection held due to possible appendectomy       Discussed plan with interdisciplinary team      Virginia Jones PA-C    "

## 2024-08-16 NOTE — CARE PLAN
Patient presents with wound to the left thigh from playing with his bothers earlier in the day. Denies use of any pain medication at home. The patient's goals for the shift include      The clinical goals for the shift include      Problem: Pain  Goal: Takes deep breaths with improved pain control throughout the shift  Outcome: Progressing  Goal: Turns in bed with improved pain control throughout the shift  Outcome: Progressing  Goal: Walks with improved pain control throughout the shift  Outcome: Progressing  Goal: Performs ADL's with improved pain control throughout shift  Outcome: Progressing  Goal: Participates in PT with improved pain control throughout the shift  Outcome: Progressing  Goal: Free from opioid side effects throughout the shift  Outcome: Progressing  Goal: Free from acute confusion related to pain meds throughout the shift  Outcome: Progressing

## 2024-08-16 NOTE — PROGRESS NOTES
Pharmacy Medication History Review    Akash Wu is a 28 y.o. female admitted for No Principal Problem: There is no principal problem currently on the Problem List. Please update the Problem List and refresh.. Pharmacy reviewed the patient's [id number] medications and allergies for accuracy.    The list below reflectives the updated PTA list. Please review each medication in order reconciliation for additional clarification and justification.  Prior to Admission Medications   Prescriptions Last Dose Informant     FLUoxetine (PROzac) 20 mg capsule 8/9/2024      Sig: Take 1 capsule (20 mg) by mouth once daily.   buPROPion XL (Wellbutrin XL) 150 mg 24 hr tablet 8/9/2024      Sig: Take 1 tablet (150 mg) by mouth once daily in the morning. Take before meals.   tirzepatide 7.5 mg/0.5 mL pen injector 8/13/2024      Sig: Inject 7.5 mg under the skin every 7 days.      Facility-Administered Medications: None      Allergies  Reviewed by Yamilet Butt CPhT on 8/16/2024        Severity Reactions Comments    Amoxicillin High Anaphylaxis     Cefdinir High Anaphylaxis, Rash, Swelling Lip, facial swelling    Penicillins High Anaphylaxis, Hives, Other, Unknown Rash, hives and face and throat swelling            Below are additional concerns with the patient's PTA list.  The following updates were made to the Prior to Admission medication list:     Source of Information:     Medications ADDED:   Tirzepatide 7.5mg  Bupropion XL 150mg  Fluoxetine 20mg  Medications CHANGED:  N/A  Medications REMOVED:   N/A  Medications NOT TAKING:   N/A    Allergy reviewed : Yes    Comments: Patient verified all medications and doses.     Yamilet Butt CPhT

## 2024-08-16 NOTE — PROGRESS NOTES
Transitional Care Coordination Progress Note:  Plan per Medical/Surgical team: treatment of colitis, UTI with IV ATB, IV flagyl, morphine, IV fluids, NPO, GI consult   Status: Observation  Payor source: [employer]   Discharge disposition: Home with mother   Potential Barriers: ?surgery for early appendicitis, surgery consult   ADOD: 8/17/2024   NAHOMY Smith RN, BSN Transitional Care Coordinator ED# [phone]      08/16/24 0642   Discharge Planning   Living Arrangements Parent   Support Systems Parent   Assistance Needed pain management, ATB plan   Type of Residence Private residence   Home or Post Acute Services None   Expected Discharge Disposition Home   Does the patient need discharge transport arranged? No   Financial Resource Strain   How hard is it for you to pay for the very basics like food, housing, medical care, and heating? Not hard   Housing Stability   In the last 12 months, was there a time when you were not able to pay the mortgage or rent on time? N   In the past 12 months, how many times have you moved where you were living? 1   At any time in the past 12 months, were you homeless or living in a shelter (including now)? N   Transportation Needs   In the past 12 months, has lack of transportation kept you from medical appointments or from getting medications? no   In the past 12 months, has lack of transportation kept you from meetings, work, or from getting things needed for daily living? No

## 2024-08-16 NOTE — ANESTHESIA PROCEDURE NOTES
Airway  Date/Time: 8/16/2024 6:28 PM  Urgency: elective    Airway not difficult    Staffing  Performed: CRNA   Authorized by: Yemi Dickinson MD    Performed by: ETELVINA Benavidez-NOEL  Patient location during procedure: OR    Indications and Patient Condition  Indications for airway management: anesthesia and airway protection  Spontaneous ventilation: present  Sedation level: deep  Preoxygenated: yes  Patient position: sniffing  Mask difficulty assessment: 1 - vent by mask    Final Airway Details  Final airway type: endotracheal airway      Successful airway: ETT  Cuffed: yes   Successful intubation technique: video laryngoscopy  Facilitating devices/methods: intubating stylet  Endotracheal tube insertion site: oral  Blade type: Galindo 3.  Blade size: #3  ETT size (mm): 7.0  Cormack-Lehane Classification: grade I - full view of glottis  Placement verified by: chest auscultation and capnometry   Inital cuff pressure (cm H2O): 0  Cuff volume (mL): 5  Measured from: lips  ETT to lips (cm): 20  Number of attempts at approach: 1

## 2024-08-16 NOTE — PROGRESS NOTES
08/16/24 0640   [facility] Disability Status   Are you deaf or do you have serious difficulty hearing? N   Are you blind or do you have serious difficulty seeing, even when wearing glasses? N   Because of a physical, mental, or emotional condition, do you have serious difficulty concentrating, remembering, or making decisions? (5 years old or older) Y  (depression, anxiety, bipolar)   Do you have serious difficulty walking or climbing stairs? N   Do you have serious difficulty dressing or bathing? N   Because of a physical, mental, or emotional condition, do you have serious difficulty doing errands alone such as visiting the doctor? N

## 2024-08-17 ENCOUNTER — PHARMACY VISIT (OUTPATIENT)
Dept: PHARMACY | Facility: CLINIC | Age: 29
End: 2024-08-17
Payer: COMMERCIAL

## 2024-08-17 VITALS
HEART RATE: 78 BPM | HEIGHT: 63 IN | RESPIRATION RATE: 16 BRPM | WEIGHT: 230 LBS | SYSTOLIC BLOOD PRESSURE: 114 MMHG | DIASTOLIC BLOOD PRESSURE: 76 MMHG | TEMPERATURE: 98.8 F | BODY MASS INDEX: 40.75 KG/M2 | OXYGEN SATURATION: 98 %

## 2024-08-17 LAB
ANION GAP SERPL CALC-SCNC: 14 MMOL/L (ref 10–20)
BACTERIA UR CULT: NORMAL
BASOPHILS # BLD AUTO: 0.02 X10*3/UL (ref 0–0.1)
BASOPHILS NFR BLD AUTO: 0.2 %
BUN SERPL-MCNC: 11 MG/DL (ref 6–23)
CALCIUM SERPL-MCNC: 8.5 MG/DL (ref 8.6–10.3)
CHLORIDE SERPL-SCNC: 107 MMOL/L (ref 98–107)
CO2 SERPL-SCNC: 21 MMOL/L (ref 21–32)
CREAT SERPL-MCNC: 0.74 MG/DL (ref 0.5–1.05)
EGFRCR SERPLBLD CKD-EPI 2021: >90 ML/MIN/1.73M*2
EOSINOPHIL # BLD AUTO: 0 X10*3/UL (ref 0–0.7)
EOSINOPHIL NFR BLD AUTO: 0 %
ERYTHROCYTE [DISTWIDTH] IN BLOOD BY AUTOMATED COUNT: 14 % (ref 11.5–14.5)
GLUCOSE SERPL-MCNC: 102 MG/DL (ref 74–99)
HCT VFR BLD AUTO: 36.9 % (ref 36–46)
HGB BLD-MCNC: 11.1 G/DL (ref 12–16)
IMM GRANULOCYTES # BLD AUTO: 0.05 X10*3/UL (ref 0–0.7)
IMM GRANULOCYTES NFR BLD AUTO: 0.4 % (ref 0–0.9)
LYMPHOCYTES # BLD AUTO: 0.88 X10*3/UL (ref 1.2–4.8)
LYMPHOCYTES NFR BLD AUTO: 7.8 %
MCH RBC QN AUTO: 24.9 PG (ref 26–34)
MCHC RBC AUTO-ENTMCNC: 30.1 G/DL (ref 32–36)
MCV RBC AUTO: 83 FL (ref 80–100)
MONOCYTES # BLD AUTO: 0.21 X10*3/UL (ref 0.1–1)
MONOCYTES NFR BLD AUTO: 1.9 %
NEUTROPHILS # BLD AUTO: 10.18 X10*3/UL (ref 1.2–7.7)
NEUTROPHILS NFR BLD AUTO: 89.7 %
NRBC BLD-RTO: 0 /100 WBCS (ref 0–0)
PLATELET # BLD AUTO: 378 X10*3/UL (ref 150–450)
POTASSIUM SERPL-SCNC: 4.4 MMOL/L (ref 3.5–5.3)
RBC # BLD AUTO: 4.45 X10*6/UL (ref 4–5.2)
SODIUM SERPL-SCNC: 138 MMOL/L (ref 136–145)
WBC # BLD AUTO: 11.3 X10*3/UL (ref 4.4–11.3)

## 2024-08-17 PROCEDURE — 96366 THER/PROPH/DIAG IV INF ADDON: CPT

## 2024-08-17 PROCEDURE — 36415 COLL VENOUS BLD VENIPUNCTURE: CPT | Performed by: NURSE PRACTITIONER

## 2024-08-17 PROCEDURE — 2500000004 HC RX 250 GENERAL PHARMACY W/ HCPCS (ALT 636 FOR OP/ED): Performed by: SURGERY

## 2024-08-17 PROCEDURE — 99232 SBSQ HOSP IP/OBS MODERATE 35: CPT

## 2024-08-17 PROCEDURE — 2500000001 HC RX 250 WO HCPCS SELF ADMINISTERED DRUGS (ALT 637 FOR MEDICARE OP): Performed by: SURGERY

## 2024-08-17 PROCEDURE — 99231 SBSQ HOSP IP/OBS SF/LOW 25: CPT | Performed by: INTERNAL MEDICINE

## 2024-08-17 PROCEDURE — 96375 TX/PRO/DX INJ NEW DRUG ADDON: CPT

## 2024-08-17 PROCEDURE — RXMED WILLOW AMBULATORY MEDICATION CHARGE

## 2024-08-17 PROCEDURE — 96367 TX/PROPH/DG ADDL SEQ IV INF: CPT

## 2024-08-17 PROCEDURE — 80048 BASIC METABOLIC PNL TOTAL CA: CPT | Performed by: NURSE PRACTITIONER

## 2024-08-17 PROCEDURE — 85025 COMPLETE CBC W/AUTO DIFF WBC: CPT | Performed by: NURSE PRACTITIONER

## 2024-08-17 PROCEDURE — 96365 THER/PROPH/DIAG IV INF INIT: CPT

## 2024-08-17 PROCEDURE — G0378 HOSPITAL OBSERVATION PER HR: HCPCS

## 2024-08-17 RX ORDER — POLYETHYLENE GLYCOL 3350 17 G/17G
17 POWDER, FOR SOLUTION ORAL DAILY
Status: DISCONTINUED | OUTPATIENT
Start: 2024-08-17 | End: 2024-08-17 | Stop reason: HOSPADM

## 2024-08-17 RX ORDER — POLYETHYLENE GLYCOL 3350 17 G/17G
17 POWDER, FOR SOLUTION ORAL DAILY
Qty: 238 G | Refills: 0 | Status: SHIPPED | OUTPATIENT
Start: 2024-08-18

## 2024-08-17 RX ORDER — IBUPROFEN 600 MG/1
600 TABLET ORAL EVERY 6 HOURS PRN
Qty: 56 TABLET | Refills: 0 | Status: SHIPPED | OUTPATIENT
Start: 2024-08-17 | End: 2024-08-31

## 2024-08-17 RX ORDER — HEPARIN SODIUM 5000 [USP'U]/ML
7500 INJECTION, SOLUTION INTRAVENOUS; SUBCUTANEOUS EVERY 8 HOURS SCHEDULED
Status: DISCONTINUED | OUTPATIENT
Start: 2024-08-17 | End: 2024-08-17 | Stop reason: HOSPADM

## 2024-08-17 RX ORDER — CIPROFLOXACIN 2 MG/ML
400 INJECTION, SOLUTION INTRAVENOUS EVERY 12 HOURS
Status: DISCONTINUED | OUTPATIENT
Start: 2024-08-17 | End: 2024-08-17 | Stop reason: SDUPTHER

## 2024-08-17 RX ORDER — OXYCODONE AND ACETAMINOPHEN 5; 325 MG/1; MG/1
1 TABLET ORAL EVERY 4 HOURS PRN
Status: DISCONTINUED | OUTPATIENT
Start: 2024-08-17 | End: 2024-08-17 | Stop reason: HOSPADM

## 2024-08-17 RX ORDER — ONDANSETRON HYDROCHLORIDE 2 MG/ML
4 INJECTION, SOLUTION INTRAVENOUS EVERY 6 HOURS PRN
Status: DISCONTINUED | OUTPATIENT
Start: 2024-08-17 | End: 2024-08-17 | Stop reason: HOSPADM

## 2024-08-17 RX ORDER — KETOROLAC TROMETHAMINE 30 MG/ML
15 INJECTION, SOLUTION INTRAMUSCULAR; INTRAVENOUS EVERY 6 HOURS
Status: DISCONTINUED | OUTPATIENT
Start: 2024-08-17 | End: 2024-08-17 | Stop reason: HOSPADM

## 2024-08-17 RX ORDER — OXYCODONE HYDROCHLORIDE 5 MG/1
5 TABLET ORAL EVERY 6 HOURS PRN
Qty: 15 TABLET | Refills: 0 | Status: SHIPPED | OUTPATIENT
Start: 2024-08-17 | End: 2024-08-22

## 2024-08-17 ASSESSMENT — COGNITIVE AND FUNCTIONAL STATUS - GENERAL
TURNING FROM BACK TO SIDE WHILE IN FLAT BAD: A LITTLE
CLIMB 3 TO 5 STEPS WITH RAILING: A LITTLE
WALKING IN HOSPITAL ROOM: A LITTLE
STANDING UP FROM CHAIR USING ARMS: A LITTLE
STANDING UP FROM CHAIR USING ARMS: A LITTLE
MOVING TO AND FROM BED TO CHAIR: A LITTLE
MOBILITY SCORE: 18
WALKING IN HOSPITAL ROOM: A LITTLE
DAILY ACTIVITIY SCORE: 24
MOVING TO AND FROM BED TO CHAIR: A LITTLE
TURNING FROM BACK TO SIDE WHILE IN FLAT BAD: A LITTLE
MOVING FROM LYING ON BACK TO SITTING ON SIDE OF FLAT BED WITH BEDRAILS: A LITTLE
MOVING FROM LYING ON BACK TO SITTING ON SIDE OF FLAT BED WITH BEDRAILS: A LITTLE
CLIMB 3 TO 5 STEPS WITH RAILING: A LITTLE

## 2024-08-17 ASSESSMENT — PAIN SCALES - GENERAL
PAINLEVEL_OUTOF10: 2
PAINLEVEL_OUTOF10: 3
PAINLEVEL_OUTOF10: 5 - MODERATE PAIN
PAINLEVEL_OUTOF10: 6

## 2024-08-17 ASSESSMENT — PAIN - FUNCTIONAL ASSESSMENT
PAIN_FUNCTIONAL_ASSESSMENT: 0-10
PAIN_FUNCTIONAL_ASSESSMENT: 0-10

## 2024-08-17 ASSESSMENT — PAIN DESCRIPTION - LOCATION: LOCATION: ABDOMEN

## 2024-08-17 NOTE — DISCHARGE INSTRUCTIONS
Instructions After Laparoscopic Surgery  Wound Care:  You have a portion of your incision that is now open to improve the redness around your incision and prevent infection   - please keep wound clean and dry throughout the day  - please shower daily, remove guaze packing prior to or during shower If the packing becomes dry and painful to remove  - let the water hit the inside of the incision, you do not need to intentionally place soap in the opening of the wound  - when you get off the shower please pat dry around the outside and replace guaze, okay to cover with abdominal bandage, you do not need tape, you may use underwear to hold into place  - DO NOT SOAK with the open incision   - The hole with gradually get smaller, okay to use less guaze as this happens  -do not apply topical creams or ointments  -call if you notice redness around wound, foul-smelliing drainage, or increasing pain   - plan is to see Dr. Flowers in the office in 7-10 days after discharge from the hospital  Diet:          -Avoid greasy, fatty foods first few weeks          -Brooks, soft meals first day or two after surgery  Activity          -Take it easy for the first 48 hours          -stairs and walks are fine          -resume activities gradually over the first week          -ask Dr Flowers before resuming strenuous physical exertions          -No driving while on pain medication  Medications          -Pain medicine prescription attached for severe pain          -You can also take Motrin/Ibuprofen 400mg every 6 hours for mild pain          -Resume your home medications unless otherwise directed          -To avoid constipation please take Colace 100mg twice a day (over the counter)  Other Instructions          -Call to make appointment within 1-2 days: [phone]          -Call the doctor for the following:   severe unrelieved pain   fevers > 101F   Nausea/vomiting   wound issues   insurance/return to work forms   shortness of breath   chest  geovany        [facility] Discharge Instructions  You came to the hospital with acute appendicitis and were admitted for observation and further care.   You were treated with laparoscopic appendectomy.   A general surgery and gastroenterologist specialist saw you while admitted and helped manage your care.   Your discharge plan is to go home to recover.    Please see your primary care doctor in 1 week for follow-up.   An appointment has been requested for you but may you need to call your doctors office to schedule.    Additionally, a referral has been ordered for you to follow up with Dr. Flowers from general surgery.   The office or scheduling department should call you to arrange an appointment in the next week or two.     For any issues or concerns with appointments, call the  scheduling line at [phone] or the providers office directly.       See the attached information for education about any new medications and the condition(s) you were treated for.  Your medications may have changed so pay close attention to the list given to you at discharge and ask any questions you have before leaving the hospital.

## 2024-08-17 NOTE — ASSESSMENT & PLAN NOTE
Akash MARCELLA Andrewcarolaristides Wu is a 28 y.o. female on day 0 of admission presenting with Acute appendicitis with localized peritonitis. Now POD#1 s/p laparoscopic appendectomy, findings significant for early acute appendicitis without perforation.

## 2024-08-17 NOTE — DISCHARGE SUMMARY
Discharge Diagnosis  Acute appendicitis with localized peritonitis    Issues Requiring Follow-Up  PCP  General Surgery    Discharge Meds     Your medication list        START taking these medications        Instructions Last Dose Given Next Dose Due   ClearLax 17 gram/dose powder  Generic drug: polyethylene glycol  Start taking on: August 18, 2024      Mix 1 capful (17 g) in 8 oz of fluid and drink by mouth once daily for constipation       ibuprofen 600 mg tablet      Take 1 tablet (600 mg) by mouth every 6 hours if needed for moderate pain (4 - 6) for up to 14 days.       oxyCODONE 5 mg immediate release tablet  Commonly known as: Roxicodone      Take 1 tablet (5 mg) by mouth every 6 hours if needed for severe pain (7 - 10) for up to 5 days.              CONTINUE taking these medications        Instructions Last Dose Given Next Dose Due   buPROPion  mg 24 hr tablet  Commonly known as: Wellbutrin XL           FLUoxetine 20 mg capsule  Commonly known as: PROzac           tirzepatide 7.5 mg/0.5 mL pen injector                     Where to Get Your Medications        These medications were sent to [facility]  [address] , [address], [place] [address]      Hours: 8 AM to 6 PM Mon-Fri, 9 AM to 1 PM Saturday Phone: [phone]   ClearLax 17 gram/dose powder  ibuprofen 600 mg tablet  oxyCODONE 5 mg immediate release tablet         Test Results Pending At Discharge  Pending Labs       Order Current Status    Extra Urine Gray Tube Collected (08/15/24 1933)    Surgical Pathology Exam Collected (08/16/24 1852)    Urinalysis with Reflex Culture and Microscopic In process            Hospital Course   Akash Wu is a 28 y.o. female with a past medical history of depression/anxiety, acute gastris, and pilonidal cyst presenting with epigastric and right lower quadrant pain X2. Associated with nausea, watery diarrhea every couple of minutes presenting for hours, and decreased appetite. Patient  reported recently traveling to [place]. ROS otherwise listed was negative.     Patient was admitted to obs. Initial labs were significant for leukocytosis that have resolved upon discharge. H/H uptrending upon discharge. UA showed leukocyte esterase, 21-50 WBCs and 2+ bacteria. Patient treated with cipro. Culture was negative. Stool studies were initially ordered due to persistent diarrhea, but later discontinued due diarrhea stopping.   CTAP shows diffuse colonic wall thickening with mild adjacent stranding suggestive of colitis. There is no significant inflammatory stranding adjacent to the appendix although the appendix is mildly dilated in size measuring up to 8 mm. Cipro and flagyl were started for early appendicitis.  Both GI and general surgery were following. Laparoscopic appendectomy was performed 8/16 without complications. Both cipro and flagyl were discontinued upon discharge per general surgery's recommendations. GI reported acute colitis was most likely related to her recent travel.  Referral for post op visit was placed for general surgery. Patient sent home with pain medication prn.      Pertinent Physical Exam At Time of Discharge  Physical Exam    Outpatient Follow-Up  No future appointments.    Discussed with Dr. Cartwright and interdisciplinary team.     Virginia Jones PA-C

## 2024-08-17 NOTE — PROGRESS NOTES
OBS PA follow up note    Subjective:  Akash Wu is a 28 y.o. female with a past medical history of depression/anxiety, acute gastris, and pilonidal cyst presenting with epigastric and right lower quadrant pain X2. Patient is POD#1 s/p laparoscopic appendectomy doing well. She reports her pain has improved significantly since yesterday.      Vitals (Last 24 Hours):  Heart Rate:  [64-99]   Temp:  [36.4 °C (97.5 °F)-36.9 °C (98.5 °F)]   Resp:  [13-20]   BP: ()/(48-90)   SpO2:  [94 %-100 %]       I have reviewed all imaging reports and labs pertinent to this visit / presenting problem    PHYSICAL EXAM:  Constitutional: NAD, alert and cooperative  Eyes: no icterus  ENMT: mucous membranes moist, no lesions  Head/Neck: supple  Respiratory/Thorax: CTA bilaterally, non-labored breathing, no cough, on RA  Cardiovascular: RRR, no murmurs heard  Gastrointestinal: soft, laparotomy sites c/d/I, no erythema or drainage   : no Kang, no SP/flank discomfort  Musculoskeletal: no joint swelling, ROM intact  Extremities: no edema  Neurological: non-focal  Skin: warm and dry  Psych: calm, stable mood     MEDS:  Scheduled meds  buPROPion XL, 150 mg, oral, Daily before breakfast  ciprofloxacin, 400 mg, intravenous, q12h  FLUoxetine, 20 mg, oral, Daily  heparin (porcine), 7,500 Units, subcutaneous, q8h SHIMA  ketorolac, 15 mg, intravenous, q6h  metroNIDAZOLE, 500 mg, intravenous, q8h  polyethylene glycol, 17 g, oral, Daily        Continuous meds       PRN meds  PRN medications: acetaminophen, acetaminophen, HYDROmorphone, ondansetron ODT **OR** ondansetron, ondansetron, oxyCODONE, oxyCODONE-acetaminophen      ASSESSMENT/PLAN:  Akash Wu is a 28 y.o. female with a past medical history of depression/anxiety, acute gastris, and pilonidal cyst presenting with epigastric and right lower quadrant pain X2. Associated with nausea, diarrhea, and decreased appetite. CBC with  H/H 11.1/36.9 improving from yesterday. UA  with possible UTI with leukocyte esterase, 21-50 WBCs and 2+ bacteria, treated with Cipro. CTAP impression: diffuse colonic wall thickening with mild adjacent stranding suggestive of colitis. There is no significant inflammatory stranding adjacent to the appendix although the appendix is mildly dilated in size measuring up to 8 mm. Mild or early appendicitis is not excluded in the appropriate clinical setting. Metro and Cipro started due to pt's allergies. GI consulted. General surgery performed appendectomy yesterday evening without complication.     Acute appendicitis with localized peritonitis  -antiemetics prn  -prn pain medication  -continue ciprofloxacin and flagyl, discontinue antibiotics upon discharge no PO needed per general surgery   -regular diet as tolerated  -pt does not report diarrhea will continue to monitor to see if C. difficile, stool pathogen panel, ova/para studies necessary  -general surgery signed off  -GI following       UTI  -Continue ciprofloxacin    Depression/anxiety  -wellbutrin/prozac resumed    Other comorbidities as above  -continue medications as ordered and adjust based on clinical course     VTE / GI prophylaxis   -subcutaneous heparin, bowel regimen in place     Discharge planning  -home when medically stable    Discussed with the interdisciplinary team     Virginia Jones PA-C

## 2024-08-17 NOTE — PROGRESS NOTES
"Akash Wu is a 28 y.o. female on day 0 of admission presenting with Acute appendicitis with localized peritonitis.    Subjective   Feeling better today  Some mucus per rectum but no stool for analysis       Objective     Physical Exam  Constitutional:       Appearance: Normal appearance. She is obese.   Neurological:      Mental Status: She is alert.   Psychiatric:         Mood and Affect: Mood normal.         Behavior: Behavior normal.         Judgment: Judgment normal.         Last Recorded Vitals  Blood pressure 115/65, pulse 69, temperature 36.7 °C (98.1 °F), temperature source Temporal, resp. rate 16, height 1.6 m (5' 3\"), weight 104 kg (230 lb), SpO2 94%.  Intake/Output last 3 Shifts:  I/O last 3 completed shifts:  In: 910 (8.7 mL/kg) [P.O.:150; I.V.:360 (3.5 mL/kg); IV Piggyback:400]  Out: 5 (0 mL/kg) [Blood:5]  Weight: 104.3 kg     Relevant Results      CT abdomen pelvis w IV contrast    Result Date: 8/15/2024  Interpreted By:  Finkelstein, Evan, STUDY: CT ABDOMEN PELVIS W IV CONTRAST;  8/15/2024 9:00 pm   INDICATION: Signs/Symptoms:Positive McBurney point, psoas, and obturator with nausea and vomiting.  Severe rebound tenderness to the right lower quadrant.  Has an IUD copper in place denies pregnancy.   COMPARISON: None   ACCESSION NUMBER(S): [id number]   ORDERING CLINICIAN: ALVARADO JOHNSON   TECHNIQUE: Axial CT images of the abdomen and pelvis with coronal and sagittal reconstructed images obtained after intravenous administration of 75 mL Omnipaque 350   FINDINGS: LOWER CHEST: No acute abnormality of the lung bases.   ABDOMEN:   LIVER: Normal attenuation and contour. BILE DUCTS: Normal caliber. GALLBLADDER: No calcified gallstones. No wall thickening. PORTAL VEIN: Patent SPLEEN: Unremarkable. PANCREAS: Unremarkable. ADRENALS: Unremarkable. KIDNEYS, URETERS and URINARY BLADDER: Symmetric renal enhancement. No hydronephrosis or perinephric fluid collection. The bladder is decompressed, which " limits evaluation. REPRODUCTIVE ORGANS: An IUD is present. No significant free pelvic fluid.   ABDOMINAL WALL: Within normal limits. PERITONEUM: No ascites, free air or fluid collection.   BOWEL: Diffuse colonic wall thickening with mild adjacent stranding. The appendix is dilated up to 8 mm, however, there is no adjacent inflammatory stranding.   VESSELS: No aortic aneurysm. RETROPERITONEUM: No pathologically enlarged retroperitoneal lymph nodes.   BONES: No acute osseous abnormality.       Diffuse colonic wall thickening with mild adjacent stranding suggestive of colitis. There is no significant inflammatory stranding adjacent to the appendix although the appendix is mildly dilated in size measuring up to 8 mm. Mild or early appendicitis is not excluded in the appropriate clinical setting.     MACRO: None.   Signed by: Evan Finkelstein 8/15/2024 9:46 PM Dictation workstation:   [id number]    Order Name Source Comment Collection Info Order Time   SURGICAL PATHOLOGY EXAM APPENDIX Pre-op diagnosis:  Acute appendicitis with localized peritonitis, unspecified whether abscess present, unspecified whether gangrene present, unspecified whether perforation present [K35.30] Collected By: Saravanan Flowers MD 8/16/2024  6:52 PM     Last relevant procedure:                          Assessment/Plan   Assessment & Plan  Acute appendicitis with localized peritonitis    Anemia    Anxiety    Depression    Bipolar disorder (Multi)    Obesity    Acute colitis most likely related to travel  OK for discharge and follow up with GI MARK if she has any lingering symptoms        I spent 15 minutes in the professional and overall care of this patient.      Arun Parham, DO

## 2024-08-17 NOTE — PROGRESS NOTES
"Akash Wu is a 28 y.o. female on day 0 of admission presenting with Acute appendicitis with localized peritonitis. Now POD#1 s/p laparoscopic appendectomy, findings significant for early acute appendicitis without perforation.    Subjective   Patient doing well, abdominal pain controlled, tolerating PO without N/V, voiding without difficulty, ambulating in the room.        Objective   PE:  Constitutional: A&Ox3, calm and cooperative  Eyes: clear sclera   ENMT: Moist mucous membranes  Head/Neck: Neck supple  Cardiovascular: Normal rate and regular rhythm.   Respiratory/Thorax:  Good symmetric chest expansion.   Gastrointestinal: Abdomen slightly distended, soft, appropriately tender, no peritoneal signs, laparotomy sites c/d/i, well approximate with Dermabond, no erythema or drainage    Genitourinary: Voiding independently   Musculoskeletal: ROM intact  Extremities: No peripheral edema, contusions, or wounds  Neurological: A&Ox3, No focal deficits   Psychological: Appropriate mood and behavior  Skin: Warm and dry, no rashes or lesions      Last Recorded Vitals  Blood pressure 115/65, pulse 69, temperature 36.7 °C (98.1 °F), temperature source Temporal, resp. rate 16, height 1.6 m (5' 3\"), weight 104 kg (230 lb), SpO2 94%.  Intake/Output last 3 Shifts:  I/O last 3 completed shifts:  In: 910 (8.7 mL/kg) [P.O.:150; I.V.:360 (3.5 mL/kg); IV Piggyback:400]  Out: 5 (0 mL/kg) [Blood:5]  Weight: 104.3 kg     Relevant Results  Results for orders placed or performed during the hospital encounter of 08/15/24 (from the past 24 hour(s))   Basic Metabolic Panel   Result Value Ref Range    Glucose 102 (H) 74 - 99 mg/dL    Sodium 138 136 - 145 mmol/L    Potassium 4.4 3.5 - 5.3 mmol/L    Chloride 107 98 - 107 mmol/L    Bicarbonate 21 21 - 32 mmol/L    Anion Gap 14 10 - 20 mmol/L    Urea Nitrogen 11 6 - 23 mg/dL    Creatinine 0.74 0.50 - 1.05 mg/dL    eGFR >90 >60 mL/min/1.73m*2    Calcium 8.5 (L) 8.6 - 10.3 mg/dL   CBC " and Auto Differential   Result Value Ref Range    WBC 11.3 4.4 - 11.3 x10*3/uL    nRBC 0.0 0.0 - 0.0 /100 WBCs    RBC 4.45 4.00 - 5.20 x10*6/uL    Hemoglobin 11.1 (L) 12.0 - 16.0 g/dL    Hematocrit 36.9 36.0 - 46.0 %    MCV 83 80 - 100 fL    MCH 24.9 (L) 26.0 - 34.0 pg    MCHC 30.1 (L) 32.0 - 36.0 g/dL    RDW 14.0 11.5 - 14.5 %    Platelets 378 150 - 450 x10*3/uL    Neutrophils % 89.7 40.0 - 80.0 %    Immature Granulocytes %, Automated 0.4 0.0 - 0.9 %    Lymphocytes % 7.8 13.0 - 44.0 %    Monocytes % 1.9 2.0 - 10.0 %    Eosinophils % 0.0 0.0 - 6.0 %    Basophils % 0.2 0.0 - 2.0 %    Neutrophils Absolute 10.18 (H) 1.20 - 7.70 x10*3/uL    Immature Granulocytes Absolute, Automated 0.05 0.00 - 0.70 x10*3/uL    Lymphocytes Absolute 0.88 (L) 1.20 - 4.80 x10*3/uL    Monocytes Absolute 0.21 0.10 - 1.00 x10*3/uL    Eosinophils Absolute 0.00 0.00 - 0.70 x10*3/uL    Basophils Absolute 0.02 0.00 - 0.10 x10*3/uL     CT abdomen pelvis w IV contrast   Final Result   Diffuse colonic wall thickening with mild adjacent stranding   suggestive of colitis. There is no significant inflammatory stranding   adjacent to the appendix although the appendix is mildly dilated in   size measuring up to 8 mm. Mild or early appendicitis is not excluded   in the appropriate clinical setting.             MACRO:   None.        Signed by: Evan Finkelstein 8/15/2024 9:46 PM   Dictation workstation:   [id number]            Assessment/Plan   Assessment & Plan  Acute appendicitis with localized peritonitis  Akash Wu is a 28 y.o. female on day 0 of admission presenting with Acute appendicitis with localized peritonitis. Now POD#1 s/p laparoscopic appendectomy, findings significant for early acute appendicitis without perforation.    Plan:  - regular diet as tolerating  - PRN pain medications  - PRN antiemetics  - bowel regimen- miralax  - cipro/flagyl while in house, no PO antibiotics needed at discharge  - DVT proph: SCD's/ Heparin   - lab  work reviewed  - no concern for C. Diff at this time, discontinue precautions    Dispo: Discussed with Dr. Flowers, okay to be discharged from surgical standpoint, will defer to medicine team for discharge.     I spent 35 minutes in the professional and overall care of this patient.      Yvette Jean, APRN-CNP

## 2024-08-17 NOTE — CARE PLAN
The patient's goals for the shift include  vss    The clinical goals for the shift include manage pain through shift    Over the shift, the patient did not make progress toward the following goals. Barriers to progression include n/a. Recommendations to address these barriers include n/a.

## 2024-08-23 LAB
LABORATORY COMMENT REPORT: NORMAL
PATH REPORT.FINAL DX SPEC: NORMAL
PATH REPORT.GROSS SPEC: NORMAL
PATH REPORT.MICROSCOPIC SPEC OTHER STN: NORMAL
PATH REPORT.RELEVANT HX SPEC: NORMAL
PATH REPORT.TOTAL CANCER: NORMAL

## 2024-09-04 ENCOUNTER — APPOINTMENT (OUTPATIENT)
Dept: PRIMARY CARE | Facility: CLINIC | Age: 29
End: 2024-09-04
Payer: COMMERCIAL

## 2024-09-11 ENCOUNTER — OFFICE VISIT (OUTPATIENT)
Dept: SURGERY | Facility: CLINIC | Age: 29
End: 2024-09-11
Payer: COMMERCIAL

## 2024-09-11 VITALS
SYSTOLIC BLOOD PRESSURE: 113 MMHG | TEMPERATURE: 97.7 F | HEART RATE: 86 BPM | WEIGHT: 235 LBS | DIASTOLIC BLOOD PRESSURE: 74 MMHG | BODY MASS INDEX: 41.64 KG/M2 | HEIGHT: 63 IN

## 2024-09-11 DIAGNOSIS — K35.30 ACUTE APPENDICITIS WITH LOCALIZED PERITONITIS, WITHOUT PERFORATION, ABSCESS, OR GANGRENE: ICD-10-CM

## 2024-09-11 DIAGNOSIS — Z09 SURGERY FOLLOW-UP: Primary | ICD-10-CM

## 2024-09-11 PROCEDURE — 99211 OFF/OP EST MAY X REQ PHY/QHP: CPT | Performed by: SURGERY

## 2024-09-11 PROCEDURE — 1036F TOBACCO NON-USER: CPT | Performed by: SURGERY

## 2024-09-11 ASSESSMENT — ENCOUNTER SYMPTOMS: DEPRESSION: 1

## 2024-09-11 ASSESSMENT — PAIN SCALES - GENERAL: PAINLEVEL: 0-NO PAIN

## 2024-09-11 NOTE — PROGRESS NOTES
Assessment/Plan   Making terrific progress following recent lap appendectomy  -We reviewed intra-operative findings and final pathology report  -Wounds healing well.  can resume activity as tolerated, including exercise  -Call for fevers, chills, worsening abdominal pain, wound issues, etc  -See me prn if I need to reassess the umbilical wound    Subjective   S/p lap appy. No pain. Feeling well.  Some umbilical wound seepage       Objective     Physical Exam  NAD  A&Ox3  Non icteric  CTA  RR  Abdomen soft min tender. Wounds clean, intact. AgNO3 applied to umbilical wound. Superficial breakdown noted. No signs infection  Extremities warm, well perfused         Relevant Results    Collected 8/16/2024 18:52       Status: Final result       Visible to patient: Yes (not seen)       Dx: Acute appendicitis with localized per...    0 Result Notes      Component    FINAL DIAGNOSIS      A.  VERMIFORM APPENDIX, EXCISION:                - NONPERFORATED ACUTE APPENDICITIS AND ASSOCIATED SEROSITIS.     Note: Please correlate with the clinical and operative examination findings.      Electronically signed by Bladimir Arshad MD on 8/23/2024 at 103        No results found for this or any previous visit (from the past 24 hour(s)).        I spent 25 minutes in the professional and overall care of this patient.      Saravanan Flowers MD

## 2024-09-11 NOTE — LETTER
September 11, 2024     Virginia Jones PA-C  [address] [facility] [address]    Patient: Akash Wu   YOB: 1995   Date of Visit: 9/11/2024       Dear Dr. Virginia Jones PA-C:    Thank you for referring Akash Wu to me for evaluation. Below are my notes for this consultation.  If you have questions, please do not hesitate to call me. I look forward to following your patient along with you.       Sincerely,     Saravanan Flowers MD      CC: No Recipients  ______________________________________________________________________________________    Assessment/Plan  Making terrific progress following recent lap appendectomy  -We reviewed intra-operative findings and final pathology report  -Wounds healing well.  can resume activity as tolerated, including exercise  -Call for fevers, chills, worsening abdominal pain, wound issues, etc  -See me prn if I need to reassess the umbilical wound    Subjective  S/p lap appy. No pain. Feeling well.  Some umbilical wound seepage       Objective    Physical Exam  NAD  A&Ox3  Non icteric  CTA  RR  Abdomen soft min tender. Wounds clean, intact. AgNO3 applied to umbilical wound. Superficial breakdown noted. No signs infection  Extremities warm, well perfused         Relevant Results    Collected 8/16/2024 18:52       Status: Final result       Visible to patient: Yes (not seen)       Dx: Acute appendicitis with localized per...    0 Result Notes      Component    FINAL DIAGNOSIS      A.  VERMIFORM APPENDIX, EXCISION:                - NONPERFORATED ACUTE APPENDICITIS AND ASSOCIATED SEROSITIS.     Note: Please correlate with the clinical and operative examination findings.      Electronically signed by Bladimir Arshad MD on 8/23/2024 at 103        No results found for this or any previous visit (from the past 24 hour(s)).        I spent 25 minutes in the professional and overall care of this patient.      Saravanan Flowers MD

## 2024-09-13 ENCOUNTER — APPOINTMENT (OUTPATIENT)
Dept: PRIMARY CARE | Facility: CLINIC | Age: 29
End: 2024-09-13
Payer: COMMERCIAL

## 2024-09-13 VITALS
DIASTOLIC BLOOD PRESSURE: 78 MMHG | HEIGHT: 63 IN | SYSTOLIC BLOOD PRESSURE: 116 MMHG | BODY MASS INDEX: 41.29 KG/M2 | WEIGHT: 233 LBS | TEMPERATURE: 98.1 F | HEART RATE: 66 BPM | OXYGEN SATURATION: 99 %

## 2024-09-13 DIAGNOSIS — E55.9 VITAMIN D DEFICIENCY: ICD-10-CM

## 2024-09-13 DIAGNOSIS — K35.30 ACUTE APPENDICITIS WITH LOCALIZED PERITONITIS, WITHOUT PERFORATION, ABSCESS, OR GANGRENE: ICD-10-CM

## 2024-09-13 DIAGNOSIS — E78.00 PURE HYPERCHOLESTEROLEMIA: ICD-10-CM

## 2024-09-13 DIAGNOSIS — F31.70 BIPOLAR AFFECTIVE DISORDER IN REMISSION (MULTI): ICD-10-CM

## 2024-09-13 DIAGNOSIS — Z00.00 ENCOUNTER FOR ANNUAL PHYSICAL EXAM: Primary | ICD-10-CM

## 2024-09-13 PROCEDURE — 99385 PREV VISIT NEW AGE 18-39: CPT | Performed by: INTERNAL MEDICINE

## 2024-09-13 PROCEDURE — 3008F BODY MASS INDEX DOCD: CPT | Performed by: INTERNAL MEDICINE

## 2024-09-13 PROCEDURE — 1036F TOBACCO NON-USER: CPT | Performed by: INTERNAL MEDICINE

## 2024-09-13 PROCEDURE — 99203 OFFICE O/P NEW LOW 30 MIN: CPT | Performed by: INTERNAL MEDICINE

## 2024-09-13 RX ORDER — FLUOXETINE HYDROCHLORIDE 20 MG/1
20 CAPSULE ORAL
Qty: 90 CAPSULE | Refills: 3 | Status: SHIPPED | OUTPATIENT
Start: 2024-09-13 | End: 2025-09-13

## 2024-09-13 RX ORDER — BUPROPION HYDROCHLORIDE 150 MG/1
150 TABLET ORAL
Qty: 90 TABLET | Refills: 3 | Status: SHIPPED | OUTPATIENT
Start: 2024-09-13 | End: 2025-09-13

## 2024-09-13 ASSESSMENT — ENCOUNTER SYMPTOMS
ACTIVITY CHANGE: 0
HEMATURIA: 0
COLOR CHANGE: 0
DYSURIA: 0
NAUSEA: 0
SHORTNESS OF BREATH: 0
FEVER: 0
PALPITATIONS: 0
PHOTOPHOBIA: 0
BLOOD IN STOOL: 0
CHEST TIGHTNESS: 0
HALLUCINATIONS: 0
SINUS PAIN: 0
ARTHRALGIAS: 0
FREQUENCY: 0
FLANK PAIN: 0
NERVOUS/ANXIOUS: 0
MYALGIAS: 0
VOICE CHANGE: 0
CONFUSION: 0
FATIGUE: 0
UNEXPECTED WEIGHT CHANGE: 0
WHEEZING: 0
NUMBNESS: 0
SORE THROAT: 0
CONSTIPATION: 0
DIARRHEA: 0
COUGH: 0
ADENOPATHY: 0
STRIDOR: 0
ABDOMINAL PAIN: 0
SLEEP DISTURBANCE: 0
SEIZURES: 0
POLYPHAGIA: 0
APPETITE CHANGE: 0
TREMORS: 0
NECK PAIN: 0
EYE PAIN: 0
WEAKNESS: 0
FACIAL ASYMMETRY: 0
SPEECH DIFFICULTY: 0
WOUND: 1
BACK PAIN: 0
TROUBLE SWALLOWING: 0
DIZZINESS: 0
VOMITING: 0
JOINT SWELLING: 0
POLYDIPSIA: 0
HEADACHES: 0

## 2024-09-13 ASSESSMENT — PATIENT HEALTH QUESTIONNAIRE - PHQ9
SUM OF ALL RESPONSES TO PHQ9 QUESTIONS 1 AND 2: 0
2. FEELING DOWN, DEPRESSED OR HOPELESS: NOT AT ALL
1. LITTLE INTEREST OR PLEASURE IN DOING THINGS: NOT AT ALL

## 2024-09-13 NOTE — PROGRESS NOTES
Subjective   Patient ID: Akash Wu is a 29 y.o. female who presents for New Patient Visit (Establish care, appendicitis last month, appendectomy on 8/16 ).    HPI   Patient presented to the office for the first time to establish [facility].    She had acute appendicitis last month followed by appendectomy on 8/16/2024. Surgical wound is healing and she had appointment yesterday on the wound check and again after 2 weeks.    She also need refill on bipolar medication. His symptoms are controlled with the medication and she is running out of them.    She also need regular labs.    She had multiple surgeries on pilonidal cyst before but it never healed completely. But it did not get infected either.    Review of Systems   Constitutional:  Negative for activity change, appetite change, fatigue, fever and unexpected weight change.   HENT:  Negative for dental problem, ear discharge, hearing loss, nosebleeds, postnasal drip, sinus pain, sore throat, trouble swallowing and voice change.    Eyes:  Negative for photophobia, pain and visual disturbance.   Respiratory:  Negative for cough, chest tightness, shortness of breath, wheezing and stridor.    Cardiovascular:  Negative for chest pain, palpitations and leg swelling.   Gastrointestinal:  Negative for abdominal pain, blood in stool, constipation, diarrhea, nausea and vomiting.   Endocrine: Negative for polydipsia, polyphagia and polyuria.   Genitourinary:  Negative for decreased urine volume, dyspareunia, dysuria, flank pain, frequency, hematuria and urgency.   Musculoskeletal:  Negative for arthralgias, back pain, gait problem, joint swelling, myalgias and neck pain.   Skin:  Positive for wound. Negative for color change, pallor and rash.   Allergic/Immunologic: Negative for environmental allergies and food allergies.   Neurological:  Negative for dizziness, tremors, seizures, syncope, facial asymmetry, speech difficulty, weakness, numbness and headaches.  "  Hematological:  Negative for adenopathy.   Psychiatric/Behavioral:  Negative for behavioral problems, confusion, hallucinations, self-injury, sleep disturbance and suicidal ideas. The patient is not nervous/anxious.      Objective   /78   Pulse 66   Temp 36.7 °C (98.1 °F)   Ht 1.6 m (5' 3\")   Wt 106 kg (233 lb)   SpO2 99%   BMI 41.27 kg/m²     Physical Exam  Vitals and nursing note reviewed.   Constitutional:       General: She is not in acute distress.     Appearance: Normal appearance. She is not ill-appearing or toxic-appearing.   HENT:      Head: Normocephalic and atraumatic.      Nose: Nose normal.   Eyes:      General:         Right eye: No discharge.         Left eye: No discharge.      Extraocular Movements: Extraocular movements intact.      Conjunctiva/sclera: Conjunctivae normal.      Pupils: Pupils are equal, round, and reactive to light.   Cardiovascular:      Rate and Rhythm: Normal rate and regular rhythm.      Pulses: Normal pulses.      Heart sounds: Normal heart sounds. No murmur heard.     No gallop.   Pulmonary:      Effort: Pulmonary effort is normal. No respiratory distress.      Breath sounds: Normal breath sounds. No stridor. No wheezing or rales.   Abdominal:      General: Bowel sounds are normal. There is no distension.      Palpations: Abdomen is soft.      Tenderness: There is no abdominal tenderness. There is no right CVA tenderness, left CVA tenderness, guarding or rebound.   Musculoskeletal:         General: No swelling or deformity. Normal range of motion.      Cervical back: Normal range of motion and neck supple. No rigidity or tenderness.      Right lower leg: No edema.      Left lower leg: No edema.   Skin:     General: Skin is warm.      Coloration: Skin is not jaundiced.      Findings: No bruising, erythema or rash.   Neurological:      General: No focal deficit present.      Mental Status: She is alert and oriented to person, place, and time.      Cranial Nerves: " No cranial nerve deficit.      Gait: Gait normal.   Psychiatric:         Mood and Affect: Mood normal.         Behavior: Behavior normal.         Thought Content: Thought content normal.         Judgment: Judgment normal.       Assessment/Plan   Problem List Items Addressed This Visit       Acute appendicitis with localized peritonitis    Bipolar disorder (Multi)    Relevant Medications    FLUoxetine (PROzac) 20 mg capsule    buPROPion XL (Wellbutrin XL) 150 mg 24 hr tablet     Other Visit Diagnoses       Encounter for annual physical exam    -  Primary    Relevant Orders    TSH with reflex to Free T4 if abnormal    Hepatitis C Antibody    HIV 1/2 Antigen/Antibody Screen with Reflex to Confirmation    Pure hypercholesterolemia        Relevant Orders    Lipid Panel    Vitamin D deficiency        Relevant Orders    Vitamin D 25-Hydroxy,Total (for eval of Vitamin D levels)

## 2024-09-20 ENCOUNTER — OFFICE VISIT (OUTPATIENT)
Dept: SURGERY | Facility: CLINIC | Age: 29
End: 2024-09-20
Payer: COMMERCIAL

## 2024-09-20 VITALS
HEART RATE: 84 BPM | BODY MASS INDEX: 40.57 KG/M2 | HEIGHT: 63 IN | WEIGHT: 229 LBS | TEMPERATURE: 97.7 F | SYSTOLIC BLOOD PRESSURE: 126 MMHG | DIASTOLIC BLOOD PRESSURE: 82 MMHG

## 2024-09-20 DIAGNOSIS — Z09 SURGERY FOLLOW-UP: Primary | ICD-10-CM

## 2024-09-20 PROCEDURE — 3008F BODY MASS INDEX DOCD: CPT | Performed by: SURGERY

## 2024-09-20 PROCEDURE — 1036F TOBACCO NON-USER: CPT | Performed by: SURGERY

## 2024-09-20 PROCEDURE — 99211 OFF/OP EST MAY X REQ PHY/QHP: CPT | Performed by: SURGERY

## 2024-09-20 ASSESSMENT — PAIN SCALES - GENERAL: PAINLEVEL: 4

## 2024-09-20 ASSESSMENT — ENCOUNTER SYMPTOMS: DEPRESSION: 1

## 2024-09-20 NOTE — PROGRESS NOTES
Assessment/Plan   Very important that we keep the skin from kissing up against itself near the navel.  Clean gauze dressing to help protect the wound would be helpful.  She can apply some Neosporin once a day.  Follow-up with me in a week.    Subjective   Akash had some breakdown of the skin in her umbilical wound.  Comes in for reassessment.  Her appendix surgery was about 5 weeks out       Objective     Physical Exam  NAD  A&Ox3  Non icteric  CTA  RR  Abdomen soft nontender.  Little bit of a skin breakdown at the supraumbilical port site wound.  Looks like the skin above and below the navel have in direct apposition creating a pressure effect.  I applied some silver nitrate to the superficial granulation tissue.  The wound does not track deeply at all.  Extremities warm, well perfused         Relevant Results      No results found for this or any previous visit (from the past 24 hour(s)).        I spent 25 minutes in the professional and overall care of this patient.      Saravanan Flowers MD

## 2024-10-04 ENCOUNTER — APPOINTMENT (OUTPATIENT)
Dept: SURGERY | Facility: CLINIC | Age: 29
End: 2024-10-04
Payer: COMMERCIAL

## 2024-10-17 PROBLEM — H72.91 PERFORATION OF RIGHT TYMPANIC MEMBRANE: Status: ACTIVE | Noted: 2024-10-17

## 2024-10-17 PROBLEM — Z86.19 HISTORY OF VARICELLA: Status: ACTIVE | Noted: 2024-10-17

## 2024-10-17 PROBLEM — B49 INFECTION DUE TO FUNGUS: Status: ACTIVE | Noted: 2024-10-17

## 2024-10-17 PROBLEM — A59.9 TRICHOMONIASIS: Status: ACTIVE | Noted: 2024-10-17

## 2024-10-17 PROBLEM — L70.9 ACNE: Status: ACTIVE | Noted: 2024-10-17

## 2024-10-17 PROBLEM — N94.6 DYSMENORRHEA: Status: ACTIVE | Noted: 2024-10-17

## 2024-10-17 PROBLEM — R50.9 FEVER: Status: ACTIVE | Noted: 2024-10-17

## 2024-10-17 PROBLEM — J18.9 PNEUMONIA: Status: ACTIVE | Noted: 2019-07-04

## 2024-10-17 PROBLEM — H66.91 ACUTE RIGHT OTITIS MEDIA: Status: ACTIVE | Noted: 2024-10-17

## 2024-10-17 PROBLEM — N89.8 VAGINAL DISCHARGE: Status: ACTIVE | Noted: 2024-10-17

## 2024-10-17 PROBLEM — J06.9 UPPER RESPIRATORY INFECTION: Status: ACTIVE | Noted: 2024-10-17

## 2024-10-17 PROBLEM — G43.101 MIGRAINE WITH AURA AND WITH STATUS MIGRAINOSUS, NOT INTRACTABLE: Status: ACTIVE | Noted: 2024-04-05

## 2024-10-17 RX ORDER — FLUCONAZOLE 150 MG/1
TABLET ORAL
COMMUNITY
Start: 2020-03-13

## 2024-10-17 RX ORDER — TRAZODONE HYDROCHLORIDE 100 MG/1
TABLET ORAL
COMMUNITY
Start: 2023-12-24

## 2024-10-17 RX ORDER — GABAPENTIN 300 MG/1
1 CAPSULE ORAL
COMMUNITY
Start: 2023-12-24

## 2024-10-17 RX ORDER — METRONIDAZOLE 500 MG/1
TABLET ORAL
COMMUNITY
Start: 2020-03-20

## 2025-01-09 ENCOUNTER — LAB (OUTPATIENT)
Dept: LAB | Facility: LAB | Age: 30
End: 2025-01-09
Payer: COMMERCIAL

## 2025-01-09 ENCOUNTER — APPOINTMENT (OUTPATIENT)
Dept: OBSTETRICS AND GYNECOLOGY | Facility: CLINIC | Age: 30
End: 2025-01-09
Payer: COMMERCIAL

## 2025-01-09 VITALS
HEIGHT: 63 IN | SYSTOLIC BLOOD PRESSURE: 108 MMHG | BODY MASS INDEX: 37.99 KG/M2 | WEIGHT: 214.4 LBS | DIASTOLIC BLOOD PRESSURE: 60 MMHG

## 2025-01-09 DIAGNOSIS — N93.9 ABNORMAL UTERINE BLEEDING (AUB): Primary | ICD-10-CM

## 2025-01-09 DIAGNOSIS — N93.9 ABNORMAL UTERINE BLEEDING (AUB): ICD-10-CM

## 2025-01-09 LAB
ERYTHROCYTE [DISTWIDTH] IN BLOOD BY AUTOMATED COUNT: 14.2 % (ref 11.5–14.5)
HCT VFR BLD AUTO: 40.4 % (ref 36–46)
HGB BLD-MCNC: 11.8 G/DL (ref 12–16)
MCH RBC QN AUTO: 24.7 PG (ref 26–34)
MCHC RBC AUTO-ENTMCNC: 29.2 G/DL (ref 32–36)
MCV RBC AUTO: 85 FL (ref 80–100)
NRBC BLD-RTO: 0 /100 WBCS (ref 0–0)
PLATELET # BLD AUTO: 384 X10*3/UL (ref 150–450)
PREGNANCY TEST URINE, POC: NEGATIVE
PROLACTIN SERPL-MCNC: 8.8 UG/L (ref 3–20)
RBC # BLD AUTO: 4.77 X10*6/UL (ref 4–5.2)
TSH SERPL-ACNC: 2.39 MIU/L (ref 0.44–3.98)
WBC # BLD AUTO: 9.6 X10*3/UL (ref 4.4–11.3)

## 2025-01-09 PROCEDURE — 81025 URINE PREGNANCY TEST: CPT | Performed by: OBSTETRICS & GYNECOLOGY

## 2025-01-09 PROCEDURE — 84443 ASSAY THYROID STIM HORMONE: CPT

## 2025-01-09 PROCEDURE — 3008F BODY MASS INDEX DOCD: CPT | Performed by: OBSTETRICS & GYNECOLOGY

## 2025-01-09 PROCEDURE — 85027 COMPLETE CBC AUTOMATED: CPT

## 2025-01-09 PROCEDURE — 99204 OFFICE O/P NEW MOD 45 MIN: CPT | Performed by: OBSTETRICS & GYNECOLOGY

## 2025-01-09 PROCEDURE — 84146 ASSAY OF PROLACTIN: CPT

## 2025-01-09 ASSESSMENT — ENCOUNTER SYMPTOMS
OCCASIONAL FEELINGS OF UNSTEADINESS: 0
ALLERGIC/IMMUNOLOGIC NEGATIVE: 0
RESPIRATORY NEGATIVE: 0
NEUROLOGICAL NEGATIVE: 0
HEMATOLOGIC/LYMPHATIC NEGATIVE: 0
PSYCHIATRIC NEGATIVE: 0
EYES NEGATIVE: 0
CARDIOVASCULAR NEGATIVE: 0
MUSCULOSKELETAL NEGATIVE: 0
DEPRESSION: 0
ENDOCRINE NEGATIVE: 0
LOSS OF SENSATION IN FEET: 0
GASTROINTESTINAL NEGATIVE: 0
CONSTITUTIONAL NEGATIVE: 0

## 2025-01-09 ASSESSMENT — PATIENT HEALTH QUESTIONNAIRE - PHQ9
9. THOUGHTS THAT YOU WOULD BE BETTER OFF DEAD, OR OF HURTING YOURSELF: SEVERAL DAYS
4. FEELING TIRED OR HAVING LITTLE ENERGY: SEVERAL DAYS
8. MOVING OR SPEAKING SO SLOWLY THAT OTHER PEOPLE COULD HAVE NOTICED. OR THE OPPOSITE, BEING SO FIGETY OR RESTLESS THAT YOU HAVE BEEN MOVING AROUND A LOT MORE THAN USUAL: SEVERAL DAYS
7. TROUBLE CONCENTRATING ON THINGS, SUCH AS READING THE NEWSPAPER OR WATCHING TELEVISION: SEVERAL DAYS
SUM OF ALL RESPONSES TO PHQ QUESTIONS 1-9: 13
SUM OF ALL RESPONSES TO PHQ9 QUESTIONS 1 & 2: 6
5. POOR APPETITE OR OVEREATING: SEVERAL DAYS
1. LITTLE INTEREST OR PLEASURE IN DOING THINGS: NEARLY EVERY DAY
6. FEELING BAD ABOUT YOURSELF - OR THAT YOU ARE A FAILURE OR HAVE LET YOURSELF OR YOUR FAMILY DOWN: SEVERAL DAYS
3. TROUBLE FALLING OR STAYING ASLEEP: SEVERAL DAYS
2. FEELING DOWN, DEPRESSED OR HOPELESS: NEARLY EVERY DAY

## 2025-01-09 ASSESSMENT — PAIN SCALES - GENERAL: PAINLEVEL_OUTOF10: 0-NO PAIN
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
## 2025-01-09 NOTE — PATIENT INSTRUCTIONS
Thanks for coming in today for follow-up.      Labs are being sent to help evaluate your abnormal bleeding.  Results should be available in the next 24 hours.  You may call the office and select option #2 to speak with the nurse to obtain the results.      Arrange to have an ultrasound performed to help evaluate your irregular bleeding.      Return to the office in the next several weeks to review your labs, ultrasound and come up with a plan of care.

## 2025-01-14 ENCOUNTER — HOSPITAL ENCOUNTER (OUTPATIENT)
Dept: RADIOLOGY | Facility: HOSPITAL | Age: 30
Discharge: HOME | End: 2025-01-14
Payer: COMMERCIAL

## 2025-01-14 DIAGNOSIS — N93.9 ABNORMAL UTERINE BLEEDING (AUB): ICD-10-CM

## 2025-01-14 PROCEDURE — 76856 US EXAM PELVIC COMPLETE: CPT | Performed by: RADIOLOGY

## 2025-01-14 PROCEDURE — 76856 US EXAM PELVIC COMPLETE: CPT

## 2025-01-14 PROCEDURE — 76830 TRANSVAGINAL US NON-OB: CPT | Performed by: RADIOLOGY

## 2025-02-13 ENCOUNTER — OFFICE VISIT (OUTPATIENT)
Dept: URGENT CARE | Age: 30
End: 2025-02-13
Payer: COMMERCIAL

## 2025-02-13 DIAGNOSIS — R10.32 LEFT LOWER QUADRANT ABDOMINAL PAIN: Primary | ICD-10-CM

## 2025-02-13 NOTE — PROGRESS NOTES
Patient presented with complaint of left ovarian pain.  During triage, she reportedly asked the MA if we do ultrasounds here is that is what was done the last time she had this pain.  She was advised by the MA that we do not have that modality available at this clinic.  She states her pain is significant enough that she will go to the emergency room to obtain the ultrasound.  I personally never evaluated or spoke to the patient.

## 2025-03-14 ENCOUNTER — APPOINTMENT (OUTPATIENT)
Dept: PRIMARY CARE | Facility: CLINIC | Age: 30
End: 2025-03-14
Payer: COMMERCIAL

## 2025-03-14 ENCOUNTER — OFFICE VISIT (OUTPATIENT)
Dept: GASTROENTEROLOGY | Facility: CLINIC | Age: 30
End: 2025-03-14
Payer: COMMERCIAL

## 2025-03-14 VITALS
DIASTOLIC BLOOD PRESSURE: 69 MMHG | HEART RATE: 78 BPM | HEIGHT: 63 IN | TEMPERATURE: 98.4 F | BODY MASS INDEX: 40.04 KG/M2 | SYSTOLIC BLOOD PRESSURE: 110 MMHG | WEIGHT: 226 LBS

## 2025-03-14 DIAGNOSIS — R10.84 GENERALIZED ABDOMINAL PAIN: Primary | ICD-10-CM

## 2025-03-14 DIAGNOSIS — R19.7 DIARRHEA, UNSPECIFIED TYPE: ICD-10-CM

## 2025-03-14 PROCEDURE — 99214 OFFICE O/P EST MOD 30 MIN: CPT | Performed by: NURSE PRACTITIONER

## 2025-03-14 ASSESSMENT — ENCOUNTER SYMPTOMS
NEUROLOGICAL NEGATIVE: 1
DIARRHEA: 1
MUSCULOSKELETAL NEGATIVE: 1
RESPIRATORY NEGATIVE: 1
CARDIOVASCULAR NEGATIVE: 1
PSYCHIATRIC NEGATIVE: 1
HEMATOLOGIC/LYMPHATIC NEGATIVE: 1
ENDOCRINE NEGATIVE: 1
ALLERGIC/IMMUNOLOGIC NEGATIVE: 1
EYES NEGATIVE: 1
CONSTITUTIONAL NEGATIVE: 1

## 2025-03-14 NOTE — PROGRESS NOTES
Subjective   Patient ID: Akash Wu is a 29 y.o. female who presents for New Patient Visit.  HPI  29-year-old female for new patient visit for evaluation of possible celiac disease  FHX - 1/2 brother celiac  Medical history includes bipolar disorder, obesity history of pilonidal cyst, psoriasis  8/2020 for acute appendectomy  8/15/2024 CT the abdomen pelvis showed diffuse colon wall thickening with stranding around appendix  Labs reviewed 1/9/2025 negative hCG  TSH 2.39  H&H 11.8 and 40.4  MCV 85  MCHC 29.2  MCH 24.7  RDW 14.2%  She does work as an ,  and   For a long time has had issues with weight   Has seen nutritionist, personal nam  Will get headaches and fatigue with certain foods  Mostly gluteen based  If he has a pasta dish will get headaches and fatigue   Alternating stools diarrhea to constipation   Bloating  Has psoriasis  Fiber- fruits, vegetables  probiotic  Lost 55 lbs on tirzepatide  Review of Systems   Constitutional: Negative.    HENT: Negative.     Eyes: Negative.    Respiratory: Negative.     Cardiovascular: Negative.    Gastrointestinal:  Positive for diarrhea.   Endocrine: Negative.    Genitourinary: Negative.    Musculoskeletal: Negative.    Skin: Negative.    Allergic/Immunologic: Negative.    Neurological: Negative.    Hematological: Negative.    Psychiatric/Behavioral: Negative.         Objective   Physical Exam  Constitutional:       Appearance: Normal appearance.   HENT:      Head: Normocephalic.      Nose: Nose normal.      Mouth/Throat:      Mouth: Mucous membranes are moist.   Eyes:      Pupils: Pupils are equal, round, and reactive to light.   Cardiovascular:      Rate and Rhythm: Normal rate and regular rhythm.      Pulses: Normal pulses.      Heart sounds: Normal heart sounds.   Pulmonary:      Effort: Pulmonary effort is normal.      Breath sounds: Normal breath sounds.   Abdominal:      General: Bowel sounds are normal.       Palpations: Abdomen is soft.   Musculoskeletal:         General: Normal range of motion.      Cervical back: Normal range of motion and neck supple.   Skin:     General: Skin is warm and dry.   Neurological:      Mental Status: She is alert.   Psychiatric:         Mood and Affect: Mood normal.         Assessment/Plan        Abdominal bloating, alternating stools- I would recommend getting blood tests for celiac, inflammation, stools for fecal elastace and calprotectin.     You may have metabolic syndrome that is affecting your weight  as well, you may want to check with your PCP about this.    I will contact you with your results and determine follow up  If your studies are normal I would recommend getting a hydrogen breath test to rule out small intestinal bacterial overgrowth as well.    ETELVINA Magallanes-CNP 03/14/25 1:12 PM

## 2025-03-14 NOTE — PATIENT INSTRUCTIONS
Abdominal bloating, alternating stools- I would recommend getting blood tests for celiac, inflammation, stools for fecal elastace and calprotectin.     You may have metabolic syndrome that is affecting your weight  as well, you may want to check with your PCP about this.    I will contact you with your results and determine follow up  If your studies are normal I would recommend getting a hydrogen breath test to rule out small intestinal bacterial overgrowth as well.

## 2025-03-15 LAB
CRP SERPL-MCNC: NORMAL MG/L
ERYTHROCYTE [SEDIMENTATION RATE] IN BLOOD BY WESTERGREN METHOD: 22 MM/H
GLIADIN IGA SER IA-ACNC: NORMAL
GLIADIN IGG SER IA-ACNC: NORMAL
IGA SERPL-MCNC: NORMAL MG/DL
TTG IGA SER-ACNC: NORMAL
TTG IGG SER-ACNC: NORMAL

## 2025-03-18 DIAGNOSIS — R19.7 DIARRHEA, UNSPECIFIED TYPE: Primary | ICD-10-CM

## 2025-03-18 DIAGNOSIS — R10.84 GENERALIZED ABDOMINAL PAIN: ICD-10-CM

## 2025-03-18 LAB
CRP SERPL-MCNC: 3.4 MG/L
ERYTHROCYTE [SEDIMENTATION RATE] IN BLOOD BY WESTERGREN METHOD: 22 MM/H
GLIADIN IGA SER IA-ACNC: <1 U/ML
GLIADIN IGG SER IA-ACNC: <1 U/ML
IGA SERPL-MCNC: 181 MG/DL (ref 47–310)
TTG IGA SER-ACNC: <1 U/ML
TTG IGG SER-ACNC: <1 U/ML

## 2025-03-18 RX ORDER — SODIUM, POTASSIUM,MAG SULFATES 17.5-3.13G
SOLUTION, RECONSTITUTED, ORAL ORAL
Qty: 354 ML | Refills: 0 | Status: SHIPPED | OUTPATIENT
Start: 2025-03-18

## 2025-03-30 DIAGNOSIS — R10.84 GENERALIZED ABDOMINAL PAIN: Primary | ICD-10-CM

## 2025-03-30 LAB
CALPROTECTIN STL-MCNT: 10 MCG/G
ELASTASE PANC STL-MCNT: >800 MCG/G

## 2025-04-17 ENCOUNTER — APPOINTMENT (OUTPATIENT)
Dept: OBSTETRICS AND GYNECOLOGY | Facility: CLINIC | Age: 30
End: 2025-04-17
Payer: COMMERCIAL

## 2025-04-17 VITALS
HEIGHT: 63 IN | BODY MASS INDEX: 39.76 KG/M2 | WEIGHT: 224.4 LBS | DIASTOLIC BLOOD PRESSURE: 76 MMHG | SYSTOLIC BLOOD PRESSURE: 124 MMHG

## 2025-04-17 DIAGNOSIS — N94.6 DYSMENORRHEA: ICD-10-CM

## 2025-04-17 DIAGNOSIS — Z01.419 ENCOUNTER FOR ANNUAL ROUTINE GYNECOLOGICAL EXAMINATION: Primary | ICD-10-CM

## 2025-04-17 PROCEDURE — 3008F BODY MASS INDEX DOCD: CPT | Performed by: OBSTETRICS & GYNECOLOGY

## 2025-04-17 PROCEDURE — 99395 PREV VISIT EST AGE 18-39: CPT | Performed by: OBSTETRICS & GYNECOLOGY

## 2025-04-17 RX ORDER — NAPROXEN SODIUM 550 MG/1
550 TABLET ORAL
Qty: 60 TABLET | Refills: 2 | Status: SHIPPED | OUTPATIENT
Start: 2025-04-17 | End: 2025-07-16

## 2025-04-17 RX ORDER — ARIPIPRAZOLE 15 MG/1
7.5 TABLET ORAL NIGHTLY
COMMUNITY
Start: 2025-04-09

## 2025-04-17 ASSESSMENT — ENCOUNTER SYMPTOMS
GASTROINTESTINAL NEGATIVE: 0
PSYCHIATRIC NEGATIVE: 0
EYES NEGATIVE: 0
MUSCULOSKELETAL NEGATIVE: 0
ENDOCRINE NEGATIVE: 0
CARDIOVASCULAR NEGATIVE: 0
RESPIRATORY NEGATIVE: 0
HEMATOLOGIC/LYMPHATIC NEGATIVE: 0
NEUROLOGICAL NEGATIVE: 0
ALLERGIC/IMMUNOLOGIC NEGATIVE: 0
CONSTITUTIONAL NEGATIVE: 0

## 2025-04-17 ASSESSMENT — PAIN SCALES - GENERAL: PAINLEVEL_OUTOF10: 0-NO PAIN

## 2025-04-17 NOTE — PATIENT INSTRUCTIONS
Thanks for coming in today for your annual GYN exam.      A Pap smear was sent.  Results should be available in the next few weeks.      A prescription for anaprox has been sent to the pharmacy to use for pelvic cramping during your cycle.     Follow-up with your PCP and other healthcare specialist as needed.      Return to the office if you would like your IUD removed and replaced or would like to try another form of contraception.

## 2025-04-17 NOTE — PROGRESS NOTES
Assessment and Plan:  Akash Wu is a 28 y/o woman presenting today for annual GYN exam.     Diagnoses:  #1 Routine annual gynecologic exam  #2 Dysmenorrhea    Assessment/Plan:  1. Annual GYN exam  - Pap smear sent.  - Maintain Paragard.  - Discussed with patient removal and replacement with Mirena if so desired.  - We will send in prescription for Anaprox that she can use for cramping.  - Return to the office PRN.  - Follow up with PCP and other healthcare professionals PRN.    Follow up with Dr. Barrera.    Christaibe Attestation  By signing my name below, I, Yariel Razo, attest that this documentation has been prepared under the direction and in the presence of Maria T Barrera MD on 4/17/2025 at 3:12 PM.     HPI:   Akash Wu is a 28 y/o woman presenting today for annual GYN exam. She reports her menses have been coming monthly but have been heavy with cramping.    ROS:  Review of Systems   Genitourinary:  Positive for menstrual problem (Menorrhagia, dysmenorrhea).     Physical Exam:   Physical Exam  Constitutional:       General: She is not in acute distress.     Appearance: Normal appearance. She is obese.   Genitourinary:      Vulva exam comments: Normal appearing external female genitalia, normal Bartholin's and [place]. [place]'s glands bilaterally  .      Vaginal exam comments: Moist, rugated, well estrogenized, well supported. Moderate amount of blood in the vault..        Right Adnexa: not tender and no mass present.     Left Adnexa: not tender and no mass present.     Adnexa exam comments: Exam is limited by habitus..      No cervical motion tenderness.      IUD strings visualized.      Uterus is not enlarged, fixed or tender.      No uterine mass detected.     Uterus exam comments: Exam is limited by habitus..   Breasts:     Right: No inverted nipple, mass, nipple discharge or skin change.      Left: No inverted nipple, mass, nipple discharge or skin change.   HENT:      Head:  Normocephalic and atraumatic.      Right Ear: External ear normal.      Left Ear: External ear normal.      Nose: Nose normal.      Mouth/Throat:      Mouth: Mucous membranes are moist.      Pharynx: Oropharynx is clear.   Eyes:      Extraocular Movements: Extraocular movements intact.      Conjunctiva/sclera: Conjunctivae normal.      Pupils: Pupils are equal, round, and reactive to light.   Neck:      Thyroid: No thyromegaly.   Cardiovascular:      Rate and Rhythm: Normal rate and regular rhythm.      Pulses: Normal pulses.      Heart sounds: Normal heart sounds.   Pulmonary:      Effort: Pulmonary effort is normal.      Breath sounds: Normal breath sounds and air entry.   Abdominal:      Palpations: Abdomen is soft.      Tenderness: There is no abdominal tenderness.   Musculoskeletal:      Cervical back: Neck supple.   Lymphadenopathy:      Upper Body:      Right upper body: No axillary adenopathy.      Left upper body: No axillary adenopathy.   Neurological:      Mental Status: She is alert and oriented to person, place, and time.      Comments: Pleasant and cooperative

## 2025-04-22 ENCOUNTER — APPOINTMENT (OUTPATIENT)
Dept: OPERATING ROOM | Facility: HOSPITAL | Age: 30
End: 2025-04-22
Payer: COMMERCIAL

## 2025-05-05 ENCOUNTER — APPOINTMENT (OUTPATIENT)
Dept: PRIMARY CARE | Facility: CLINIC | Age: 30
End: 2025-05-05
Payer: COMMERCIAL

## 2025-05-05 VITALS
OXYGEN SATURATION: 97 % | DIASTOLIC BLOOD PRESSURE: 72 MMHG | BODY MASS INDEX: 39.94 KG/M2 | WEIGHT: 225.4 LBS | TEMPERATURE: 97.7 F | SYSTOLIC BLOOD PRESSURE: 124 MMHG | HEART RATE: 78 BPM | HEIGHT: 63 IN

## 2025-05-05 DIAGNOSIS — E78.5 DYSLIPIDEMIA: ICD-10-CM

## 2025-05-05 DIAGNOSIS — Z11.4 ENCOUNTER FOR SCREENING FOR HIV: ICD-10-CM

## 2025-05-05 DIAGNOSIS — E55.9 VITAMIN D DEFICIENCY: ICD-10-CM

## 2025-05-05 DIAGNOSIS — F31.70 BIPOLAR AFFECTIVE DISORDER IN REMISSION (MULTI): Primary | ICD-10-CM

## 2025-05-05 DIAGNOSIS — Z11.59 NEED FOR HEPATITIS C SCREENING TEST: ICD-10-CM

## 2025-05-05 DIAGNOSIS — E28.2 PCOS (POLYCYSTIC OVARIAN SYNDROME): ICD-10-CM

## 2025-05-05 PROCEDURE — 1036F TOBACCO NON-USER: CPT | Performed by: INTERNAL MEDICINE

## 2025-05-05 PROCEDURE — 3008F BODY MASS INDEX DOCD: CPT | Performed by: INTERNAL MEDICINE

## 2025-05-05 PROCEDURE — 99214 OFFICE O/P EST MOD 30 MIN: CPT | Performed by: INTERNAL MEDICINE

## 2025-05-05 RX ORDER — METFORMIN HYDROCHLORIDE 500 MG/1
500 TABLET ORAL
Qty: 180 TABLET | Refills: 3 | Status: SHIPPED | OUTPATIENT
Start: 2025-05-05 | End: 2026-05-05

## 2025-05-05 RX ORDER — ARIPIPRAZOLE 15 MG/1
7.5 TABLET ORAL NIGHTLY
Qty: 45 TABLET | Refills: 3 | Status: SHIPPED | OUTPATIENT
Start: 2025-05-05 | End: 2026-05-05

## 2025-05-05 RX ORDER — BUPROPION HYDROCHLORIDE 150 MG/1
150 TABLET ORAL
Qty: 90 TABLET | Refills: 3 | Status: SHIPPED | OUTPATIENT
Start: 2025-05-05 | End: 2026-05-05

## 2025-05-05 ASSESSMENT — PAIN SCALES - GENERAL: PAINLEVEL_OUTOF10: 0-NO PAIN

## 2025-05-05 NOTE — PROGRESS NOTES
"Subjective   Patient ID: Akash Wu is a 29 y.o. female who presents for Follow-up (Medication questions , metformin questions).    HPI   Patient presented to the office for follow up and also her obgyn suggested to start metformin for possible PCOS/ Metabolic disorder.    Bipolar disorder is well controlled and patient is on Abilify and bupropion.     Review of Systems   Constitutional:  Negative for activity change, appetite change, fatigue, fever and unexpected weight change.   HENT:  Negative for dental problem, ear discharge, hearing loss, nosebleeds, postnasal drip, sinus pain, sore throat, trouble swallowing and voice change.    Eyes:  Negative for photophobia, pain and visual disturbance.   Respiratory:  Negative for cough, chest tightness, shortness of breath, wheezing and stridor.    Cardiovascular:  Negative for chest pain, palpitations and leg swelling.   Gastrointestinal:  Negative for abdominal pain, blood in stool, constipation, diarrhea, nausea and vomiting.   Endocrine: Negative for polydipsia, polyphagia and polyuria.   Genitourinary:  Negative for decreased urine volume, dysuria, flank pain, hematuria and urgency.   Musculoskeletal:  Negative for arthralgias, back pain, gait problem, joint swelling, myalgias and neck pain.   Skin:  Negative for color change, rash and wound.   Allergic/Immunologic: Negative for environmental allergies and food allergies.   Neurological:  Negative for dizziness, tremors, seizures, syncope, facial asymmetry, speech difficulty, weakness, numbness and headaches.   Hematological:  Negative for adenopathy.   Psychiatric/Behavioral:  Negative for behavioral problems, confusion, hallucinations, self-injury, sleep disturbance and suicidal ideas. The patient is not nervous/anxious.      Objective   /72   Pulse 78   Temp 36.5 °C (97.7 °F)   Ht 1.6 m (5' 3\")   Wt 102 kg (225 lb 6.4 oz)   LMP 04/13/2025 (Exact Date)   SpO2 97%   BMI 39.93 kg/m² "     Physical Exam  Vitals and nursing note reviewed.   Constitutional:       General: She is not in acute distress.     Appearance: Normal appearance. She is obese. She is not ill-appearing or toxic-appearing.   HENT:      Head: Normocephalic.      Nose: Nose normal. No congestion.   Eyes:      General:         Right eye: No discharge.         Left eye: No discharge.      Conjunctiva/sclera: Conjunctivae normal.   Cardiovascular:      Rate and Rhythm: Normal rate and regular rhythm.      Pulses: Normal pulses.      Heart sounds: Normal heart sounds. No murmur heard.  Pulmonary:      Effort: Pulmonary effort is normal. No respiratory distress.      Breath sounds: Normal breath sounds. No stridor. No wheezing, rhonchi or rales.   Abdominal:      Tenderness: There is no abdominal tenderness.   Musculoskeletal:         General: No deformity. Normal range of motion.      Cervical back: Normal range of motion. No rigidity or tenderness.   Skin:     General: Skin is warm.      Coloration: Skin is not jaundiced.      Findings: No erythema or rash.   Neurological:      General: No focal deficit present.      Mental Status: She is alert and oriented to person, place, and time.      Cranial Nerves: No cranial nerve deficit.      Gait: Gait normal.   Psychiatric:         Mood and Affect: Mood normal.         Behavior: Behavior normal.       Assessment/Plan   Problem List Items Addressed This Visit       Bipolar disorder - Primary    Relevant Medications    buPROPion XL (Wellbutrin XL) 150 mg 24 hr tablet    ARIPiprazole (Abilify) 15 mg tablet     Other Visit Diagnoses         PCOS (polycystic ovarian syndrome)        Relevant Medications    metFORMIN (Glucophage) 500 mg tablet      Dyslipidemia        Relevant Orders    Lipid Panel      Vitamin D deficiency        Relevant Orders    Vitamin D 25-Hydroxy,Total (for eval of Vitamin D levels)      Need for hepatitis C screening test        Relevant Orders    Hepatitis C Antibody       Encounter for screening for HIV        Relevant Orders    HIV 1/2 Antigen/Antibody Screen with Reflex to Confirmation          RTC at your next office visit.

## 2025-05-06 PROBLEM — R50.9 FEVER: Status: RESOLVED | Noted: 2024-10-17 | Resolved: 2025-05-06

## 2025-05-06 PROBLEM — J18.9 PNEUMONIA: Status: RESOLVED | Noted: 2019-07-04 | Resolved: 2025-05-06

## 2025-05-06 PROBLEM — J06.9 UPPER RESPIRATORY INFECTION: Status: RESOLVED | Noted: 2024-10-17 | Resolved: 2025-05-06

## 2025-05-06 PROBLEM — H66.91 ACUTE RIGHT OTITIS MEDIA: Status: RESOLVED | Noted: 2024-10-17 | Resolved: 2025-05-06

## 2025-05-06 PROBLEM — H72.91 PERFORATION OF RIGHT TYMPANIC MEMBRANE: Status: RESOLVED | Noted: 2024-10-17 | Resolved: 2025-05-06

## 2025-05-06 PROBLEM — A59.9 TRICHOMONIASIS: Status: RESOLVED | Noted: 2024-10-17 | Resolved: 2025-05-06

## 2025-05-06 PROBLEM — B49 INFECTION DUE TO FUNGUS: Status: RESOLVED | Noted: 2024-10-17 | Resolved: 2025-05-06

## 2025-05-06 PROBLEM — N89.8 VAGINAL DISCHARGE: Status: RESOLVED | Noted: 2024-10-17 | Resolved: 2025-05-06

## 2025-05-06 ASSESSMENT — ENCOUNTER SYMPTOMS
NERVOUS/ANXIOUS: 0
HEMATURIA: 0
SLEEP DISTURBANCE: 0
PHOTOPHOBIA: 0
BLOOD IN STOOL: 0
ARTHRALGIAS: 0
HEADACHES: 0
EYE PAIN: 0
WHEEZING: 0
FACIAL ASYMMETRY: 0
PALPITATIONS: 0
POLYDIPSIA: 0
WOUND: 0
SEIZURES: 0
TROUBLE SWALLOWING: 0
SORE THROAT: 0
DIZZINESS: 0
VOICE CHANGE: 0
DIARRHEA: 0
CONSTIPATION: 0
TREMORS: 0
NECK PAIN: 0
WEAKNESS: 0
NAUSEA: 0
APPETITE CHANGE: 0
STRIDOR: 0
DYSURIA: 0
SHORTNESS OF BREATH: 0
NUMBNESS: 0
VOMITING: 0
SINUS PAIN: 0
FLANK PAIN: 0
BACK PAIN: 0
COLOR CHANGE: 0
ABDOMINAL PAIN: 0
COUGH: 0
ACTIVITY CHANGE: 0
CONFUSION: 0
CHEST TIGHTNESS: 0
UNEXPECTED WEIGHT CHANGE: 0
ADENOPATHY: 0
FEVER: 0
FATIGUE: 0
HALLUCINATIONS: 0
SPEECH DIFFICULTY: 0
POLYPHAGIA: 0
JOINT SWELLING: 0
MYALGIAS: 0

## 2025-05-08 LAB
CYTOLOGY CMNT CVX/VAG CYTO-IMP: NORMAL
LAB AP CONTRACEPTIVE HISTORY: NORMAL
LAB AP HPV GENOTYPE QUESTION: YES
LAB AP HPV HR: NORMAL
LABORATORY COMMENT REPORT: NORMAL
LMP START DATE: NORMAL
PATH REPORT.TOTAL CANCER: NORMAL
RESIDENT REVIEW: NORMAL

## 2025-07-09 ENCOUNTER — APPOINTMENT (OUTPATIENT)
Dept: GASTROENTEROLOGY | Facility: CLINIC | Age: 30
End: 2025-07-09
Payer: COMMERCIAL

## 2025-07-10 ENCOUNTER — APPOINTMENT (OUTPATIENT)
Dept: PRIMARY CARE | Facility: CLINIC | Age: 30
End: 2025-07-10
Payer: COMMERCIAL

## 2025-07-10 VITALS
DIASTOLIC BLOOD PRESSURE: 80 MMHG | SYSTOLIC BLOOD PRESSURE: 124 MMHG | OXYGEN SATURATION: 99 % | TEMPERATURE: 98.1 F | HEART RATE: 81 BPM

## 2025-07-10 DIAGNOSIS — N20.0 RENAL CALCULUS, LEFT: Primary | ICD-10-CM

## 2025-07-10 PROCEDURE — 99212 OFFICE O/P EST SF 10 MIN: CPT | Performed by: INTERNAL MEDICINE

## 2025-07-10 ASSESSMENT — ENCOUNTER SYMPTOMS
FLANK PAIN: 0
ADENOPATHY: 0
FACIAL ASYMMETRY: 0
SPEECH DIFFICULTY: 0
DYSURIA: 0
CHEST TIGHTNESS: 0
FATIGUE: 0
FEVER: 0
CONSTIPATION: 0
DIARRHEA: 0
SEIZURES: 0
BACK PAIN: 0
STRIDOR: 0
DIZZINESS: 0
SINUS PAIN: 0
TROUBLE SWALLOWING: 0
MYALGIAS: 0
HALLUCINATIONS: 0
ARTHRALGIAS: 0
NERVOUS/ANXIOUS: 0
VOICE CHANGE: 0
NAUSEA: 0
JOINT SWELLING: 0
TREMORS: 0
WHEEZING: 0
NUMBNESS: 0
PHOTOPHOBIA: 0
APPETITE CHANGE: 0
WEAKNESS: 0
EYE PAIN: 0
HEMATURIA: 0
SORE THROAT: 0
HEADACHES: 0
SLEEP DISTURBANCE: 0
COUGH: 0
WOUND: 0
CONFUSION: 0
ABDOMINAL PAIN: 0
SHORTNESS OF BREATH: 0
UNEXPECTED WEIGHT CHANGE: 0
ACTIVITY CHANGE: 0
FREQUENCY: 0
PALPITATIONS: 0
NECK PAIN: 0
COLOR CHANGE: 0

## 2025-07-10 NOTE — PROGRESS NOTES
Subjective   Patient ID: Akash Wu is a 29 y.o. female who presents for No chief complaint on file..    HPI   Patient went to [place] for a location few days ago where she developed extreme abdominal pain in the left upper side of the abdomen and she had to go to the emergency room at 4 AM.  Patient over there found to have left kidney stone and she was asked to drink plenty of water which she is doing.  She want to get another CT scan to make sure there is no more kidney stones.  She denied for having any problem today.    Review of Systems   Constitutional:  Negative for activity change, appetite change, fatigue, fever and unexpected weight change.   HENT:  Negative for congestion, dental problem, ear discharge, ear pain, nosebleeds, postnasal drip, sinus pain, sore throat, trouble swallowing and voice change.    Eyes:  Negative for photophobia, pain and visual disturbance.   Respiratory:  Negative for cough, chest tightness, shortness of breath, wheezing and stridor.    Cardiovascular:  Negative for chest pain, palpitations and leg swelling.   Gastrointestinal:  Negative for abdominal pain, constipation, diarrhea and nausea.   Endocrine: Negative for polyuria.   Genitourinary:  Negative for decreased urine volume, dysuria, flank pain, frequency, hematuria and urgency.   Musculoskeletal:  Negative for arthralgias, back pain, gait problem, joint swelling, myalgias and neck pain.   Skin:  Negative for color change, rash and wound.   Allergic/Immunologic: Negative for environmental allergies and food allergies.   Neurological:  Negative for dizziness, tremors, seizures, syncope, facial asymmetry, speech difficulty, weakness, numbness and headaches.   Hematological:  Negative for adenopathy.   Psychiatric/Behavioral:  Negative for behavioral problems, confusion, hallucinations, sleep disturbance and suicidal ideas. The patient is not nervous/anxious.      Objective   /80   Pulse 81   Temp 36.7 °C  (98.1 °F)   SpO2 99%     Physical Exam  Vitals and nursing note reviewed.   Constitutional:       General: She is not in acute distress.     Appearance: Normal appearance. She is not ill-appearing or toxic-appearing.   HENT:      Nose: Nose normal. No congestion.   Eyes:      Conjunctiva/sclera: Conjunctivae normal.   Pulmonary:      Effort: Pulmonary effort is normal.   Musculoskeletal:         General: No deformity. Normal range of motion.      Cervical back: Normal range of motion. No rigidity.   Neurological:      General: No focal deficit present.      Mental Status: She is alert and oriented to person, place, and time.      Coordination: Coordination normal.      Gait: Gait normal.   Psychiatric:         Mood and Affect: Mood normal.         Behavior: Behavior normal.       Assessment/Plan   Problem List Items Addressed This Visit    None  Visit Diagnoses         Renal calculus, left    -  Primary    Relevant Orders    CT abdomen pelvis wo IV contrast

## 2025-07-22 ENCOUNTER — HOSPITAL ENCOUNTER (OUTPATIENT)
Dept: RADIOLOGY | Facility: CLINIC | Age: 30
Discharge: HOME | End: 2025-07-22
Payer: COMMERCIAL

## 2025-07-22 DIAGNOSIS — N20.0 RENAL CALCULUS, LEFT: ICD-10-CM

## 2025-07-22 PROCEDURE — 74176 CT ABD & PELVIS W/O CONTRAST: CPT | Performed by: RADIOLOGY

## 2025-07-22 PROCEDURE — 74176 CT ABD & PELVIS W/O CONTRAST: CPT

## 2025-07-31 ENCOUNTER — OFFICE VISIT (OUTPATIENT)
Dept: UROLOGY | Facility: CLINIC | Age: 30
End: 2025-07-31
Payer: COMMERCIAL

## 2025-07-31 VITALS — TEMPERATURE: 97 F

## 2025-07-31 DIAGNOSIS — N20.0 KIDNEY STONES: Primary | ICD-10-CM

## 2025-07-31 LAB
POC APPEARANCE, URINE: CLEAR
POC BILIRUBIN, URINE: NEGATIVE
POC BLOOD, URINE: ABNORMAL
POC COLOR, URINE: YELLOW
POC GLUCOSE, URINE: NEGATIVE MG/DL
POC KETONES, URINE: NEGATIVE MG/DL
POC LEUKOCYTES, URINE: ABNORMAL
POC NITRITE,URINE: NEGATIVE
POC PH, URINE: 7 PH
POC PROTEIN, URINE: ABNORMAL MG/DL
POC SPECIFIC GRAVITY, URINE: 1.01
POC UROBILINOGEN, URINE: 0.2 EU/DL

## 2025-07-31 PROCEDURE — 1036F TOBACCO NON-USER: CPT

## 2025-07-31 PROCEDURE — 99204 OFFICE O/P NEW MOD 45 MIN: CPT

## 2025-07-31 PROCEDURE — 81002 URINALYSIS NONAUTO W/O SCOPE: CPT

## 2025-07-31 ASSESSMENT — PAIN SCALES - GENERAL: PAINLEVEL_OUTOF10: 2

## 2025-07-31 NOTE — PROGRESS NOTES
Chief complaint:  No chief complaint on file.  Referring physician:  No ref. provider found     SUBJECTIVE:    Medical history:   has a past medical history of Abnormal Pap smear of cervix, Anxiety, Chronic constipation, Chronic diarrhea, Depression, Irritable bowel syndrome, Kidney stone, Migraine, Personal history of other diseases of the digestive system (08/26/2014), Personal history of other diseases of the digestive system (08/26/2014), Personal history of other diseases of the female genital tract (05/27/2015), Personal history of other infectious and parasitic diseases, Personal history of other mental and behavioral disorders (05/05/2014), Slurred speech (06/02/2014), Urinary tract infection, Urogenital trichomoniasis, and Varicella.   Surgical history:   has a past surgical history that includes Pilonidal cyst drainage (12/27/2016) and Appendectomy.  Family history:  family history includes Breast cancer in her maternal grandmother; Celiac disease in her brother; Depression in her maternal grandmother; Diabetes in her maternal grandmother; Heart attack in her maternal grandfather; Hernia in her father; Mental illness in her maternal grandmother; Stroke in her maternal grandfather.  Social history:   reports that she has never smoked. She has never been exposed to tobacco smoke. She has never used smokeless tobacco. She reports current alcohol use of about 5.0 standard drinks of alcohol per week. She reports that she does not use drugs.    Medications:    Current Outpatient Medications   Medication Instructions    ARIPiprazole (ABILIFY) 7.5 mg, oral, Nightly    buPROPion XL (WELLBUTRIN XL) 150 mg, oral, Daily before breakfast    metFORMIN (GLUCOPHAGE) 500 mg, oral, 2 times daily (morning and late afternoon)      Allergies:    RX Allergies[1]     ROS:  14-point review of systems negative except as noted above.      HPI:  Akash Wu is a 29 y.o. female with a history of urolithiasis who presents  "for initial evaluation of kidney stone  The patient presents for follow-up after a recent episode of renal colic that occurred while she was out of the country. Her current CT scan shows only a small, non-obstructive residual nephrolithiasis. She is here to discuss the next steps in management.    OBJECTIVE:  There were no vitals taken for this visit.There is no height or weight on file to calculate BMI.    Physical exam  General:  No acute distress  HEENT:  EOMI  CV:  Regular rate  Pulm:  Nonlabored respirations  Abd:  Soft, non-distended  :  No suprapubic or CVA tenderness  MSK:  No contractures  Neuro:  Motor intact  Psych:  Appropriate affect    Labs:    I have personally reviewed your lab tests  Lab Results   Component Value Date    WBC 9.6 01/09/2025    HGB 11.8 (L) 01/09/2025    HCT 40.4 01/09/2025     01/09/2025    ALT 12 08/16/2024    AST 13 08/16/2024     08/17/2024    K 4.4 08/17/2024     08/17/2024    CREATININE 0.74 08/17/2024    BUN 11 08/17/2024    CO2 21 08/17/2024    INR 1.2 (H) 08/15/2024    HGBA1C 5.3 04/05/2024     Urine Culture (no units)   Date Value   08/15/2024 Normal genitourinary candido    No results found for: \"PSA\"    Imaging:    I have personally reviewed images    ASSESSMENT:   Akash Wu is a 29 y.o. female presenting with  kidney stones  History of nephrolithiasis with recent renal colic episode abroad.  The patient presents for follow-up after a recent episode of renal colic. A current CT scan shows only a small, non-obstructive residual renal stone. She is currently asymptomatic and seeking guidance on future management. Given her history of stones, further evaluation is not  indicated.      PLAN:  Plan:    Conservative management:    No immediate surgical intervention is required as the residual stone is non-obstructive and the patient is asymptomatic.    Recommended increased oral hydration and dietary modifications as appropriate.        Follow-up " imaging:    Plan for repeat imaging (CT or renal ultrasound) in 6-12 months to monitor for stone progression or recurrence.  Problem List Items Addressed This Visit    None       Follow-up 1 year  G [id number]  Visit complexity inherent to evaluation and management (E&M) associated with medical care services that serve as the continuing focal point for all needed health care services and/or with medical care services that are part of ongoing care related to a patient's single, serious condition or a complex condition.    Rosalino Cedeño MD    Problem List Items Addressed This Visit    None            [1]   Allergies  Allergen Reactions    Amoxicillin Anaphylaxis    Cefdinir Anaphylaxis, Rash and Swelling     Lip, facial swelling    Penicillins Anaphylaxis, Hives, Other and Unknown     Rash, hives and face and throat swelling

## 2025-08-01 ENCOUNTER — APPOINTMENT (OUTPATIENT)
Dept: OBSTETRICS AND GYNECOLOGY | Facility: CLINIC | Age: 30
End: 2025-08-01
Payer: COMMERCIAL

## 2025-08-01 VITALS — BODY MASS INDEX: 40.57 KG/M2 | SYSTOLIC BLOOD PRESSURE: 122 MMHG | DIASTOLIC BLOOD PRESSURE: 84 MMHG | WEIGHT: 229 LBS

## 2025-08-01 DIAGNOSIS — N93.9 ABNORMAL UTERINE BLEEDING (AUB): ICD-10-CM

## 2025-08-01 DIAGNOSIS — R10.2 FEMALE PELVIC PAIN: ICD-10-CM

## 2025-08-01 DIAGNOSIS — N83.201 CYST OF RIGHT OVARY: Primary | ICD-10-CM

## 2025-08-01 PROCEDURE — 99214 OFFICE O/P EST MOD 30 MIN: CPT | Performed by: STUDENT IN AN ORGANIZED HEALTH CARE EDUCATION/TRAINING PROGRAM

## 2025-08-01 PROCEDURE — 1036F TOBACCO NON-USER: CPT | Performed by: STUDENT IN AN ORGANIZED HEALTH CARE EDUCATION/TRAINING PROGRAM

## 2025-08-01 ASSESSMENT — PAIN SCALES - GENERAL: PAINLEVEL_OUTOF10: 0-NO PAIN

## 2025-08-01 NOTE — PROGRESS NOTES
Subjective   Patient ID: Akash Wu is a 29 y.o. female who presents for Consult (Patient wants to discuss cyst found on right side of ovary and wants to discuss symptoms of PCOS. Patient has a obygn, but can't get in until next year.).  New patient here for incidentally found right ovarian cyst.  Patient does report some left pelvic pain.  Cyst was discovered when patient had a CT scan for a kidney stone.  Patient reports no significant weight change in the last few months does report that her periods are heavy and painful but she is happy with her copper IUD and would prefer to not change it.  Medical problems are bipolar disorder and obesity.  She is working hard to lose weight.        Review of Systems   Genitourinary:  Positive for menstrual problem and pelvic pain.   All other systems reviewed and are negative.      Objective   Physical Exam  Vitals reviewed.   Constitutional:       General: She is not in acute distress.     Appearance: She is not ill-appearing.   Pulmonary:      Effort: Pulmonary effort is normal.     Skin:     Coloration: Skin is not pale.     Neurological:      Mental Status: She is alert.         Assessment/Plan   Diagnoses and all orders for this visit:  Cyst of right ovary  -     US PELVIS TRANSABDOMINAL WITH TRANSVAGINAL; Future  Abnormal uterine bleeding (AUB)  Female pelvic pain    CT imaging reviewed with patient.  Homogenous appearing mass with no visible calcifications.  Low suspicion for dermoid or malignancy at this time.  Will obtain ultrasound in 3 months to reassess.  Torsion precautions reviewed.  Will have patient follow-up in 3 months.       Pierre Swan MD 08/01/25 5:44 PM

## 2025-12-19 ENCOUNTER — APPOINTMENT (OUTPATIENT)
Dept: DERMATOLOGY | Facility: CLINIC | Age: 30
End: 2025-12-19
Payer: COMMERCIAL

## 2026-02-10 ENCOUNTER — APPOINTMENT (OUTPATIENT)
Dept: OBSTETRICS AND GYNECOLOGY | Facility: CLINIC | Age: 31
End: 2026-02-10
Payer: COMMERCIAL

## 2026-04-23 ENCOUNTER — APPOINTMENT (OUTPATIENT)
Dept: OBSTETRICS AND GYNECOLOGY | Facility: CLINIC | Age: 31
End: 2026-04-23
Payer: COMMERCIAL

## 2026-05-05 ENCOUNTER — APPOINTMENT (OUTPATIENT)
Dept: PRIMARY CARE | Facility: CLINIC | Age: 31
End: 2026-05-05
Payer: COMMERCIAL

## 2026-08-03 ENCOUNTER — APPOINTMENT (OUTPATIENT)
Dept: UROLOGY | Facility: CLINIC | Age: 31
End: 2026-08-03
Payer: COMMERCIAL

## (undated) DEVICE — TROCAR SYSTEM, BALLOON, KII GELPORT, 12 X 100MM

## (undated) DEVICE — TOWEL, SURGICAL, NEURO, O/R, 16 X 26, BLUE, STERILE

## (undated) DEVICE — CANNULA, KII ADVANCED FIXATION, 5X100MM W/SEAL

## (undated) DEVICE — ADHESIVE, SKIN, LIQUIBAND EXCEED

## (undated) DEVICE — SCOPE WARMER, LAPAROSCOPE, BAG ONLY, LF

## (undated) DEVICE — SUTURE, VICRYL, 0, 27 IN, UR-6, VIOLET

## (undated) DEVICE — PUMP, STRYKERFLOW 2 & HANDPIECE W/10FT. IRRIGATION TUBING

## (undated) DEVICE — SUTURE, MONOCRYL, 4-0, 18 IN, PS2, UNDYED

## (undated) DEVICE — TUBE SET, PNEUMOLAR HEATED, SMOKE EVACU, HIGH-FLOW

## (undated) DEVICE — RETRIEVAL SYSTEM, MONARCH, 10MM DISP ENDOSCOPIC

## (undated) DEVICE — STAPLER,  ENDO ECHELON 45MM RELOAD, BLUE

## (undated) DEVICE — GLOVE, SURGICAL, PROTEXIS PI ORTHO, 7.0, PF, LF

## (undated) DEVICE — STAPLER, EF POWERED PLUS, CUTTER 340MM, 45MM EFFECTOR, DISP

## (undated) DEVICE — Device

## (undated) DEVICE — LIGASURE, V SEALER/DIVIDER  5MM BLUNT TIP